# Patient Record
Sex: MALE | Race: WHITE | NOT HISPANIC OR LATINO | Employment: OTHER | URBAN - METROPOLITAN AREA
[De-identification: names, ages, dates, MRNs, and addresses within clinical notes are randomized per-mention and may not be internally consistent; named-entity substitution may affect disease eponyms.]

---

## 2017-01-25 ENCOUNTER — LAB (OUTPATIENT)
Dept: ONCOLOGY | Facility: HOSPITAL | Age: 67
End: 2017-01-25

## 2017-01-25 DIAGNOSIS — D45 POLYCYTHEMIA VERA (HCC): ICD-10-CM

## 2017-01-25 PROCEDURE — 36415 COLL VENOUS BLD VENIPUNCTURE: CPT

## 2017-02-01 ENCOUNTER — APPOINTMENT (OUTPATIENT)
Dept: ONCOLOGY | Facility: HOSPITAL | Age: 67
End: 2017-02-01

## 2017-02-23 RX ORDER — HYDROXYUREA 500 MG/1
CAPSULE ORAL
Qty: 60 CAPSULE | Refills: 11 | Status: SHIPPED | OUTPATIENT
Start: 2017-02-23 | End: 2017-03-06 | Stop reason: SDUPTHER

## 2017-03-06 RX ORDER — HYDROXYUREA 500 MG/1
1000 CAPSULE ORAL DAILY
Qty: 60 CAPSULE | Refills: 11 | Status: SHIPPED | OUTPATIENT
Start: 2017-03-06 | End: 2018-04-02 | Stop reason: SDUPTHER

## 2017-04-26 ENCOUNTER — OFFICE VISIT (OUTPATIENT)
Dept: ONCOLOGY | Facility: CLINIC | Age: 67
End: 2017-04-26

## 2017-04-26 VITALS
BODY MASS INDEX: 28.36 KG/M2 | HEIGHT: 74 IN | DIASTOLIC BLOOD PRESSURE: 80 MMHG | RESPIRATION RATE: 18 BRPM | TEMPERATURE: 97.8 F | HEART RATE: 59 BPM | SYSTOLIC BLOOD PRESSURE: 131 MMHG | WEIGHT: 221 LBS

## 2017-04-26 DIAGNOSIS — D45 POLYCYTHEMIA VERA (HCC): Primary | ICD-10-CM

## 2017-04-26 PROCEDURE — 99213 OFFICE O/P EST LOW 20 MIN: CPT | Performed by: INTERNAL MEDICINE

## 2017-04-26 NOTE — PROGRESS NOTES
"PROBLEM LIST:  Oncology/Hematology History    1.  SHELBY-2 mutation positive polycythemia vera  2.  Goal to maintain HCT at 45 or below   3.   Hydrea 1000 mg/day started 11/2/16            Polycythemia vera    6/30/2016 Initial Diagnosis    Polycythemia vera       REASON FOR VISIT: Polycythemia vera    HISTORY OF PRESENT ILLNESS:   66-year-old gentleman with polycythemia vera presents today for follow-up.  He is currently on Hydrea 1000 mg once a day and seems to be tolerating it reasonably well.  His hemoglobin is 14.1 with an hematocrit of 40.  He denies any symptoms of polycythemia at this time.  He continues on aspirin daily.   No new events todays.  Monthly labs have been stable    Past medical history, social history and family history was reviewed and unchanged from prior visit.    Review of Systems:    Review of Systems   Constitutional: Negative.    HENT:  Negative.    Eyes: Negative.    Respiratory: Negative.    Cardiovascular: Negative.    Gastrointestinal: Negative.    Endocrine: Negative.    Genitourinary: Negative.     Musculoskeletal: Negative.    Skin: Negative.    Hematological: Negative.    Psychiatric/Behavioral: Negative.       A comprehensive 14 point review of systems was performed and was negative except as mentioned.      Medications:  The current medication list was reviewed in the EMR    ALLERGIES:  No Known Allergies      Physical Exam    VITAL SIGNS:  /80  Pulse 59  Temp 97.8 °F (36.6 °C)  Resp 18  Ht 74\" (188 cm)  Wt 221 lb (100 kg)  BMI 28.37 kg/m2     Performance Status: 0    General: well appearing, in no acute distress  HEENT: sclera anicteric, oropharynx clear, neck is supple  Lymphatics: no cervical, supraclavicular, or axillary adenopathy  Cardiovascular: regular rate and rhythm, no murmurs, rubs or gallops  Lungs: clear to auscultation bilaterally  Abdomen: soft, nontender, nondistended.  No palpable organomegaly  Extremities: no lower extremity edema  Skin: no rashes, " lesions, bruising, or petechiae  Msk:  Shows no weakness of the large muscle groups  Psych: Mood is stable      Hemoglobin 14 with a hematocrit of 40 with a MCV of 107 Plt count of 193    Assessment/Plan    66-year-old gentleman with polycythemia vera.  The Hydrea seems to be managing is polycythemia.  Repeat Labs in 3 months then rtc in 6 months.        Abdulkadir Garg MD  [facility] Hematology and Oncology    4/26/2017         Please note that portions of this note may have been completed with a voice recognition program. Efforts were made to edit the dictations, but occasionally words are mistranscribed.

## 2017-10-25 ENCOUNTER — OFFICE VISIT (OUTPATIENT)
Dept: ONCOLOGY | Facility: CLINIC | Age: 67
End: 2017-10-25

## 2017-10-25 VITALS
SYSTOLIC BLOOD PRESSURE: 132 MMHG | HEART RATE: 62 BPM | HEIGHT: 74 IN | DIASTOLIC BLOOD PRESSURE: 76 MMHG | BODY MASS INDEX: 28.49 KG/M2 | RESPIRATION RATE: 18 BRPM | WEIGHT: 222 LBS | TEMPERATURE: 98.1 F

## 2017-10-25 DIAGNOSIS — D45 POLYCYTHEMIA VERA (HCC): Primary | ICD-10-CM

## 2017-10-25 PROCEDURE — 99213 OFFICE O/P EST LOW 20 MIN: CPT | Performed by: INTERNAL MEDICINE

## 2017-10-25 RX ORDER — SODIUM CHLORIDE 9 MG/ML
250 INJECTION, SOLUTION INTRAVENOUS ONCE
Status: CANCELLED | OUTPATIENT
Start: 2017-10-25

## 2017-10-25 RX ORDER — AMOXICILLIN 500 MG/1
500 CAPSULE ORAL 3 TIMES DAILY
Qty: 21 CAPSULE | Refills: 1 | Status: SHIPPED | OUTPATIENT
Start: 2017-10-25 | End: 2019-04-18 | Stop reason: ALTCHOICE

## 2017-10-25 RX ORDER — ASPIRIN 81 MG/1
81 TABLET, CHEWABLE ORAL DAILY
COMMUNITY

## 2017-10-25 NOTE — PROGRESS NOTES
"PROBLEM LIST:  Oncology/Hematology History    1.  SHELBY-2 mutation positive polycythemia vera  2.  Goal to maintain HCT at 45 or below   3.   Hydrea 1000 mg/day started 11/2/16            Polycythemia vera    6/30/2016 Initial Diagnosis     Polycythemia vera          REASON FOR VISIT: Polycythemia vera    HISTORY OF PRESENT ILLNESS:   67-year-old gentleman with polycythemia vera presents today for follow-up.  He is currently on Hydrea 1000 mg once a day and seems to be tolerating it reasonably well.  His hemoglobin is 14.2 with an hematocrit of 42.  He denies any symptoms of polycythemia at this time.  He continues on aspirin daily.   No new events todays.      Past medical history, social history and family history was reviewed and unchanged from prior visit.    Review of Systems:    Review of Systems   Constitutional: Negative.    HENT:  Negative.    Eyes: Negative.    Respiratory: Negative.    Cardiovascular: Negative.    Gastrointestinal: Negative.    Endocrine: Negative.    Genitourinary: Negative.     Musculoskeletal: Negative.    Skin: Negative.    Hematological: Negative.    Psychiatric/Behavioral: Negative.       A comprehensive 14 point review of systems was performed and was negative except as mentioned.      Medications:  The current medication list was reviewed in the EMR    ALLERGIES:  No Known Allergies      Physical Exam    VITAL SIGNS:  /76  Pulse 62  Temp 98.1 °F (36.7 °C)  Resp 18  Ht 74\" (188 cm)  Wt 222 lb (101 kg)  BMI 28.5 kg/m2     Performance Status: 0    General: well appearing, in no acute distress  HEENT: sclera anicteric, oropharynx clear, neck is supple  Lymphatics: no cervical, supraclavicular, or axillary adenopathy  Cardiovascular: regular rate and rhythm, no murmurs, rubs or gallops  Lungs: clear to auscultation bilaterally  Abdomen: soft, nontender, nondistended.  No palpable organomegaly  Extremities: no lower extremity edema  Skin: no rashes, lesions, bruising, or " petechiae  Msk:  Shows no weakness of the large muscle groups  Psych: Mood is stable          Assessment/Plan    67 year-old gentleman with polycythemia vera.  Controlled on hydrea.  Repeat Labs in 3 months then rtc in 6 months.        Abdulkadir Garg MD  [facility] Hematology and Oncology    10/25/2017         Please note that portions of this note may have been completed with a voice recognition program. Efforts were made to edit the dictations, but occasionally words are mistranscribed.

## 2018-01-12 ENCOUNTER — RESULTS ENCOUNTER (OUTPATIENT)
Dept: ONCOLOGY | Facility: CLINIC | Age: 68
End: 2018-01-12

## 2018-01-12 DIAGNOSIS — D45 POLYCYTHEMIA VERA (HCC): ICD-10-CM

## 2018-04-02 RX ORDER — HYDROXYUREA 500 MG/1
1000 CAPSULE ORAL DAILY
Qty: 180 CAPSULE | Refills: 3 | Status: SHIPPED | OUTPATIENT
Start: 2018-04-02 | End: 2019-03-27 | Stop reason: SDUPTHER

## 2018-04-06 ENCOUNTER — RESULTS ENCOUNTER (OUTPATIENT)
Dept: ONCOLOGY | Facility: CLINIC | Age: 68
End: 2018-04-06

## 2018-04-06 DIAGNOSIS — D45 POLYCYTHEMIA VERA (HCC): ICD-10-CM

## 2018-04-20 LAB
BASOPHILS # BLD AUTO: 0 X10E3/UL (ref 0–0.2)
BASOPHILS NFR BLD AUTO: 0 %
EOSINOPHIL # BLD AUTO: 0 X10E3/UL (ref 0–0.4)
EOSINOPHIL NFR BLD AUTO: 0 %
ERYTHROCYTE [DISTWIDTH] IN BLOOD BY AUTOMATED COUNT: 14 % (ref 12.3–15.4)
HCT VFR BLD AUTO: 43.1 % (ref 37.5–51)
HGB BLD-MCNC: 14.7 G/DL (ref 13–17.7)
IMM GRANULOCYTES # BLD: 0 X10E3/UL (ref 0–0.1)
IMM GRANULOCYTES NFR BLD: 0 %
LYMPHOCYTES # BLD AUTO: 2.1 X10E3/UL (ref 0.7–3.1)
LYMPHOCYTES NFR BLD AUTO: 31 %
MCH RBC QN AUTO: 38.3 PG (ref 26.6–33)
MCHC RBC AUTO-ENTMCNC: 34.1 G/DL (ref 31.5–35.7)
MCV RBC AUTO: 112 FL (ref 79–97)
MONOCYTES # BLD AUTO: 0.7 X10E3/UL (ref 0.1–0.9)
MONOCYTES NFR BLD AUTO: 10 %
NEUTROPHILS # BLD AUTO: 4.1 X10E3/UL (ref 1.4–7)
NEUTROPHILS NFR BLD AUTO: 59 %
PLATELET # BLD AUTO: 259 X10E3/UL (ref 150–379)
RBC # BLD AUTO: 3.84 X10E6/UL (ref 4.14–5.8)
WBC # BLD AUTO: 7 X10E3/UL (ref 3.4–10.8)

## 2018-04-26 ENCOUNTER — OFFICE VISIT (OUTPATIENT)
Dept: ONCOLOGY | Facility: CLINIC | Age: 68
End: 2018-04-26

## 2018-04-26 VITALS
WEIGHT: 225 LBS | HEART RATE: 60 BPM | DIASTOLIC BLOOD PRESSURE: 80 MMHG | BODY MASS INDEX: 28.88 KG/M2 | RESPIRATION RATE: 18 BRPM | TEMPERATURE: 98 F | SYSTOLIC BLOOD PRESSURE: 138 MMHG | HEIGHT: 74 IN

## 2018-04-26 DIAGNOSIS — D45 POLYCYTHEMIA VERA (HCC): Primary | ICD-10-CM

## 2018-04-26 PROCEDURE — 99213 OFFICE O/P EST LOW 20 MIN: CPT | Performed by: INTERNAL MEDICINE

## 2018-04-26 NOTE — PROGRESS NOTES
"PROBLEM LIST:  Oncology/Hematology History    1.  SHELBY-2 mutation positive polycythemia vera  2.  Goal to maintain HCT at 45 or below   3.   Hydrea 1000 mg/day started 11/2/16            Polycythemia vera    6/30/2016 Initial Diagnosis     Polycythemia vera          REASON FOR VISIT: Polycythemia vera    HISTORY OF PRESENT ILLNESS:   67-year-old gentleman with polycythemia vera presents today for follow-up.  He is currently on Hydrea 1000 mg once a day and seems to be tolerating it reasonably well.  His hemoglobin is 14.7 with an hematocrit of 43.  He denies any symptoms of polycythemia at this time.  He continues on aspirin daily.   No new events todays.      Past medical history, social history and family history was reviewed and unchanged from prior visit.    Review of Systems:    Review of Systems   Constitutional: Negative.    HENT:  Negative.    Eyes: Negative.    Respiratory: Negative.    Cardiovascular: Negative.    Gastrointestinal: Negative.    Endocrine: Negative.    Genitourinary: Negative.     Musculoskeletal: Negative.    Skin: Negative.    Hematological: Negative.    Psychiatric/Behavioral: Negative.       A comprehensive 14 point review of systems was performed and was negative except as mentioned.      Medications:  The current medication list was reviewed in the EMR    ALLERGIES:  No Known Allergies      Physical Exam    VITAL SIGNS:  /80   Pulse 60   Temp 98 °F (36.7 °C)   Resp 18   Ht 188 cm (74\")   Wt 102 kg (225 lb)   BMI 28.89 kg/m²      Performance Status: 0    General: well appearing, in no acute distress  HEENT: sclera anicteric, oropharynx clear, neck is supple  Lymphatics: no cervical, supraclavicular, or axillary adenopathy  Cardiovascular: regular rate and rhythm, no murmurs, rubs or gallops  Lungs: clear to auscultation bilaterally  Abdomen: soft, nontender, nondistended.  No palpable organomegaly  Extremities: no lower extremity edema  Skin: no rashes, lesions, bruising, or " petechiae  Msk:  Shows no weakness of the large muscle groups  Psych: Mood is stable    Lab Results   Component Value Date    HGB 14.7 04/19/2018    HCT 43.1 04/19/2018     (H) 04/19/2018     04/19/2018    WBC 7.0 04/19/2018    NEUTROABS 4.1 04/19/2018    LYMPHSABS 2.1 04/19/2018    MONOSABS 0.7 04/19/2018    EOSABS 0.0 04/19/2018    BASOSABS 0.0 04/19/2018       Assessment/Plan    1 polycythemia vera.  Controlled on hydrea.  Repeat Labs in 3 months then rtc in 6 months.      2. Fatigue: need to increase activity      Abdulkadir Garg MD  [facility] Hematology and Oncology    4/26/2018         Please note that portions of this note may have been completed with a voice recognition program. Efforts were made to edit the dictations, but occasionally words are mistranscribed.

## 2018-06-29 ENCOUNTER — RESULTS ENCOUNTER (OUTPATIENT)
Dept: ONCOLOGY | Facility: CLINIC | Age: 68
End: 2018-06-29

## 2018-06-29 DIAGNOSIS — D45 POLYCYTHEMIA VERA (HCC): ICD-10-CM

## 2018-07-13 ENCOUNTER — RESULTS ENCOUNTER (OUTPATIENT)
Dept: ONCOLOGY | Facility: CLINIC | Age: 68
End: 2018-07-13

## 2018-07-13 DIAGNOSIS — D45 POLYCYTHEMIA VERA (HCC): ICD-10-CM

## 2018-09-19 ENCOUNTER — TELEPHONE (OUTPATIENT)
Dept: ONCOLOGY | Facility: CLINIC | Age: 68
End: 2018-09-19

## 2018-09-19 NOTE — TELEPHONE ENCOUNTER
----- Message from Arlene Campos RN sent at 9/18/2018  4:43 PM EDT -----  Regarding: FW: ADELE - PATIENT NEEDS SURGERY, HAS QUESTION  Contact: [phone]      ----- Message -----  From: Odessa Elliott  Sent: 9/18/2018   3:29 PM  To: [facility]  Subject: ADELE - PATIENT NEEDS SURGERY, HAS QUESTION        PATIENT BROKE HIS ARM ON Sunday.  HE WILL BE HAVING SURGERY NEXT Monday TO FIX THIS.  WANTED TO MAKE SURE THIS DOESN'T INTERFERE WITH ANYTHING HE SEES DR ANGULO FOR.

## 2018-09-19 NOTE — TELEPHONE ENCOUNTER
Spoke with  Fatou. He fell last week and has a complete fracture in his arm. He is scheduled for surgery on Monday. Per Darrius, may precede as scheduled, no special instructions from him.

## 2018-09-21 ENCOUNTER — RESULTS ENCOUNTER (OUTPATIENT)
Dept: ONCOLOGY | Facility: CLINIC | Age: 68
End: 2018-09-21

## 2018-09-21 DIAGNOSIS — D45 POLYCYTHEMIA VERA (HCC): ICD-10-CM

## 2018-10-05 ENCOUNTER — RESULTS ENCOUNTER (OUTPATIENT)
Dept: ONCOLOGY | Facility: CLINIC | Age: 68
End: 2018-10-05

## 2018-10-05 DIAGNOSIS — D45 POLYCYTHEMIA VERA (HCC): ICD-10-CM

## 2018-10-20 LAB
BASOPHILS # BLD AUTO: 0 X10E3/UL (ref 0–0.2)
BASOPHILS NFR BLD AUTO: 0 %
EOSINOPHIL # BLD AUTO: 0.1 X10E3/UL (ref 0–0.4)
EOSINOPHIL NFR BLD AUTO: 1 %
ERYTHROCYTE [DISTWIDTH] IN BLOOD BY AUTOMATED COUNT: 13.5 % (ref 12.3–15.4)
FERRITIN SERPL-MCNC: 92 NG/ML (ref 30–400)
HCT VFR BLD AUTO: 41.7 % (ref 37.5–51)
HGB BLD-MCNC: 14.4 G/DL (ref 13–17.7)
IMM GRANULOCYTES # BLD: 0 X10E3/UL (ref 0–0.1)
IMM GRANULOCYTES NFR BLD: 0 %
LYMPHOCYTES # BLD AUTO: 2.3 X10E3/UL (ref 0.7–3.1)
LYMPHOCYTES NFR BLD AUTO: 33 %
MCH RBC QN AUTO: 37.2 PG (ref 26.6–33)
MCHC RBC AUTO-ENTMCNC: 34.5 G/DL (ref 31.5–35.7)
MCV RBC AUTO: 108 FL (ref 79–97)
MONOCYTES # BLD AUTO: 0.7 X10E3/UL (ref 0.1–0.9)
MONOCYTES NFR BLD AUTO: 10 %
MORPHOLOGY BLD-IMP: (no result)
NEUTROPHILS # BLD AUTO: 3.8 X10E3/UL (ref 1.4–7)
NEUTROPHILS NFR BLD AUTO: 56 %
PLATELET # BLD AUTO: 228 X10E3/UL (ref 150–379)
RBC # BLD AUTO: 3.87 X10E6/UL (ref 4.14–5.8)
WBC # BLD AUTO: 7 X10E3/UL (ref 3.4–10.8)

## 2018-10-25 ENCOUNTER — OFFICE VISIT (OUTPATIENT)
Dept: ONCOLOGY | Facility: CLINIC | Age: 68
End: 2018-10-25

## 2018-10-25 VITALS
HEART RATE: 64 BPM | DIASTOLIC BLOOD PRESSURE: 83 MMHG | SYSTOLIC BLOOD PRESSURE: 139 MMHG | TEMPERATURE: 98.4 F | WEIGHT: 223 LBS | HEIGHT: 74 IN | BODY MASS INDEX: 28.62 KG/M2 | RESPIRATION RATE: 18 BRPM

## 2018-10-25 DIAGNOSIS — D45 POLYCYTHEMIA VERA (HCC): Primary | ICD-10-CM

## 2018-10-25 PROCEDURE — 99213 OFFICE O/P EST LOW 20 MIN: CPT | Performed by: INTERNAL MEDICINE

## 2018-10-25 NOTE — PROGRESS NOTES
"PROBLEM LIST:  Oncology/Hematology History    1.  SHELBY-2 mutation positive polycythemia vera  2.  Goal to maintain HCT at 45 or below   3.   Hydrea 1000 mg/day started 11/2/16            Polycythemia vera (CMS/[facility])    6/30/2016 Initial Diagnosis     Polycythemia vera          REASON FOR VISIT: Polycythemia vera    HISTORY OF PRESENT ILLNESS:   68 y.o.  gentleman with polycythemia vera presents today for follow-up.  He is currently on Hydrea 1000 mg once a day and seems to be tolerating it reasonably well.  His hemoglobin is 14.4 with an hematocrit of 41.  He denies any symptoms of polycythemia at this time.  He continues on aspirin daily.   No new events todays.  Recent fracture of the right arm due to trauma.    Past medical history, social history and family history was reviewed and unchanged from prior visit.    Review of Systems:    Review of Systems   Constitutional: Negative.    HENT:  Negative.    Eyes: Negative.    Respiratory: Negative.    Cardiovascular: Negative.    Gastrointestinal: Negative.    Endocrine: Negative.    Genitourinary: Negative.     Musculoskeletal: Negative.    Skin: Negative.    Hematological: Negative.    Psychiatric/Behavioral: Negative.       A comprehensive 14 point review of systems was performed and was negative except as mentioned.      Medications:  The current medication list was reviewed in the EMR    ALLERGIES:  No Known Allergies      Physical Exam    VITAL SIGNS:  /83   Pulse 64   Temp 98.4 °F (36.9 °C)   Resp 18   Ht 188 cm (74\")   Wt 101 kg (223 lb)   BMI 28.63 kg/m²      Performance Status: 0    General: well appearing, in no acute distress  HEENT: sclera anicteric, oropharynx clear, neck is supple  Lymphatics: no cervical, supraclavicular, or axillary adenopathy  Cardiovascular: regular rate and rhythm, no murmurs, rubs or gallops  Lungs: clear to auscultation bilaterally  Abdomen: soft, nontender, nondistended.  No palpable organomegaly  Extremities: no lower " extremity edema  Skin: no rashes, lesions, bruising, or petechiae  Msk:  Shows no weakness of the large muscle groups  Psych: Mood is stable    Lab Results   Component Value Date    HGB 14.4 10/19/2018    HCT 41.7 10/19/2018     (H) 10/19/2018     10/19/2018    WBC 7.0 04/19/2018    NEUTROABS 3.8 10/19/2018    LYMPHSABS 2.3 10/19/2018    MONOSABS 0.7 10/19/2018    EOSABS 0.1 10/19/2018    BASOSABS 0.0 10/19/2018       Assessment/Plan    1.  polycythemia vera.  Controlled on hydrea.  Repeat Labs in 6 months      2. Fatigue: need to increase activity      Abdulkadir Garg MD  [facility] Hematology and Oncology    10/25/2018     Return on: 04/17/19  Return in (Approximately): 6 months    Orders Placed This Encounter   Procedures   • CBC & Differential     Standing Status:   Future     Standing Expiration Date:   10/25/2019     Order Specific Question:   Manual Differential     Answer:   No         Please note that portions of this note may have been completed with a voice recognition program. Efforts were made to edit the dictations, but occasionally words are mistranscribed.

## 2018-12-28 ENCOUNTER — RESULTS ENCOUNTER (OUTPATIENT)
Dept: ONCOLOGY | Facility: CLINIC | Age: 68
End: 2018-12-28

## 2018-12-28 DIAGNOSIS — D45 POLYCYTHEMIA VERA (HCC): ICD-10-CM

## 2019-03-22 ENCOUNTER — RESULTS ENCOUNTER (OUTPATIENT)
Dept: ONCOLOGY | Facility: CLINIC | Age: 69
End: 2019-03-22

## 2019-03-22 DIAGNOSIS — D45 POLYCYTHEMIA VERA (HCC): ICD-10-CM

## 2019-03-27 RX ORDER — HYDROXYUREA 500 MG/1
1000 CAPSULE ORAL DAILY
Qty: 180 CAPSULE | Refills: 3 | Status: SHIPPED | OUTPATIENT
Start: 2019-03-27 | End: 2020-03-18

## 2019-04-13 LAB
BASOPHILS # BLD AUTO: 0 X10E3/UL (ref 0–0.2)
BASOPHILS NFR BLD AUTO: 0 %
EOSINOPHIL # BLD AUTO: 0 X10E3/UL (ref 0–0.4)
EOSINOPHIL NFR BLD AUTO: 0 %
ERYTHROCYTE [DISTWIDTH] IN BLOOD BY AUTOMATED COUNT: 13.7 % (ref 12.3–15.4)
FERRITIN SERPL-MCNC: 121 NG/ML (ref 30–400)
HCT VFR BLD AUTO: 41.4 % (ref 37.5–51)
HGB BLD-MCNC: 14.4 G/DL (ref 13–17.7)
IMM GRANULOCYTES # BLD AUTO: 0 X10E3/UL (ref 0–0.1)
IMM GRANULOCYTES NFR BLD AUTO: 0 %
LYMPHOCYTES # BLD AUTO: 1.8 X10E3/UL (ref 0.7–3.1)
LYMPHOCYTES NFR BLD AUTO: 27 %
MCH RBC QN AUTO: 38 PG (ref 26.6–33)
MCHC RBC AUTO-ENTMCNC: 34.8 G/DL (ref 31.5–35.7)
MCV RBC AUTO: 109 FL (ref 79–97)
MONOCYTES # BLD AUTO: 0.6 X10E3/UL (ref 0.1–0.9)
MONOCYTES NFR BLD AUTO: 10 %
NEUTROPHILS # BLD AUTO: 4.1 X10E3/UL (ref 1.4–7)
NEUTROPHILS NFR BLD AUTO: 63 %
PLATELET # BLD AUTO: 234 X10E3/UL (ref 150–379)
RBC # BLD AUTO: 3.79 X10E6/UL (ref 4.14–5.8)
WBC # BLD AUTO: 6.5 X10E3/UL (ref 3.4–10.8)

## 2019-04-18 ENCOUNTER — OFFICE VISIT (OUTPATIENT)
Dept: ONCOLOGY | Facility: CLINIC | Age: 69
End: 2019-04-18

## 2019-04-18 VITALS
RESPIRATION RATE: 18 BRPM | SYSTOLIC BLOOD PRESSURE: 137 MMHG | HEART RATE: 61 BPM | HEIGHT: 74 IN | WEIGHT: 222 LBS | TEMPERATURE: 98.5 F | BODY MASS INDEX: 28.49 KG/M2 | DIASTOLIC BLOOD PRESSURE: 83 MMHG

## 2019-04-18 DIAGNOSIS — Z79.899 ENCOUNTER FOR LONG-TERM (CURRENT) USE OF HIGH-RISK MEDICATION: ICD-10-CM

## 2019-04-18 DIAGNOSIS — D45 POLYCYTHEMIA VERA (HCC): Primary | ICD-10-CM

## 2019-04-18 PROCEDURE — 99213 OFFICE O/P EST LOW 20 MIN: CPT | Performed by: NURSE PRACTITIONER

## 2019-04-18 NOTE — PROGRESS NOTES
"PROBLEM LIST:  Oncology/Hematology History    1.  SHELBY-2 mutation positive polycythemia vera  2.  Goal to maintain HCT at 45 or below   3.   Hydrea 1000 mg/day started 11/2/16            Polycythemia vera (CMS/[facility])    6/30/2016 Initial Diagnosis     Polycythemia vera            REASON FOR VISIT: Polycythemia vera    HISTORY OF PRESENT ILLNESS:   68 y.o.  gentleman with polycythemia vera presents today for follow-up.  He is currently on Hydrea 1000 mg once a day and seems to be tolerating it reasonably well.  His CBC is normal, hemoglobin is 14.4 with an hematocrit of 41.4.  he denies any symptoms of polycythemia at this time.  He continues on aspirin daily.   He does report fatigue but this does not interfere with his daily activities and he is quite active farming, working and traveling.      Past medical history, social history and family history was reviewed and unchanged from prior visit.    Review of Systems:    Review of Systems   Constitutional: Positive for fatigue.   HENT:  Negative.    Eyes: Negative.    Respiratory: Negative.    Cardiovascular: Negative.    Gastrointestinal: Negative.    Endocrine: Negative.    Genitourinary: Negative.     Musculoskeletal: Negative.    Skin: Negative.    Neurological: Negative.    Hematological: Negative.    Psychiatric/Behavioral: Negative.       A comprehensive 14 point review of systems was performed and was negative except as mentioned.      Medications:  The current medication list was reviewed in the EMR    ALLERGIES:  No Known Allergies      Physical Exam    VITAL SIGNS:  /83   Pulse 61   Temp 98.5 °F (36.9 °C)   Resp 18   Ht 188 cm (74\")   Wt 101 kg (222 lb)   BMI 28.50 kg/m²      Performance Status: 0    General: well appearing, in no acute distress  HEENT: sclera anicteric, oropharynx clear, neck is supple  Lymphatics: no cervical, supraclavicular, or axillary adenopathy  Cardiovascular: regular rate and rhythm, no murmurs, rubs or gallops  Lungs: " clear to auscultation bilaterally  Abdomen: soft, nontender, nondistended.  No palpable organomegaly  Extremities: no lower extremity edema  Skin: no rashes, lesions, bruising, or petechiae  Msk:  Shows no weakness of the large muscle groups  Psych: Mood is stable    Lab Results   Component Value Date    HGB 14.4 04/12/2019    HCT 41.4 04/12/2019     (H) 04/12/2019     04/12/2019    WBC 6.5 04/12/2019    NEUTROABS 4.1 04/12/2019    LYMPHSABS 1.8 04/12/2019    MONOSABS 0.6 04/12/2019    EOSABS 0.0 04/12/2019    BASOSABS 0.0 04/12/2019       Assessment/Plan    1.  Polycythemia vera.  Controlled on hydrea, CBC as outlined above was normal, hematocrit 41.4% with goal to keep under 45%.  He has not required phlebotomy for the past 2 years.  He does have nondescript fatigue but this does not interfere with daily life, he remains quite active farming, working and traveling often.  We will repeat Labs in 6 months prior to return.      I spent 15 minutes with the patient. I spent > 50% percent of this time counseling and discussing prognosis, diagnostic testing, evaluation, current status and management.    Garima Oswald [facility] Hematology and Oncology    4/18/2019     Return in (Approximately): 6 months    Orders Placed This Encounter   Procedures   • Comprehensive Metabolic Panel     Standing Status:   Future     Standing Expiration Date:   4/17/2020   • CBC & Differential     Standing Status:   Future     Standing Expiration Date:   4/17/2020     Order Specific Question:   Manual Differential     Answer:   No         Please note that portions of this note may have been completed with a voice recognition program. Efforts were made to edit the dictations, but occasionally words are mistranscribed.

## 2019-10-25 LAB
ALBUMIN SERPL-MCNC: 4.3 G/DL (ref 3.5–4.8)
ALBUMIN/GLOB SERPL: 1.9 {RATIO} (ref 1.2–2.2)
ALP SERPL-CCNC: 74 IU/L (ref 39–117)
ALT SERPL-CCNC: 20 IU/L (ref 0–44)
AST SERPL-CCNC: 22 IU/L (ref 0–40)
BASOPHILS # BLD AUTO: 0 X10E3/UL (ref 0–0.2)
BASOPHILS NFR BLD AUTO: 0 %
BILIRUB SERPL-MCNC: 0.4 MG/DL (ref 0–1.2)
BUN SERPL-MCNC: 15 MG/DL (ref 8–27)
BUN/CREAT SERPL: 19 (ref 10–24)
CALCIUM SERPL-MCNC: 9.5 MG/DL (ref 8.6–10.2)
CHLORIDE SERPL-SCNC: 103 MMOL/L (ref 96–106)
CO2 SERPL-SCNC: 26 MMOL/L (ref 20–29)
CREAT SERPL-MCNC: 0.79 MG/DL (ref 0.76–1.27)
EOSINOPHIL # BLD AUTO: 0 X10E3/UL (ref 0–0.4)
EOSINOPHIL NFR BLD AUTO: 0 %
ERYTHROCYTE [DISTWIDTH] IN BLOOD BY AUTOMATED COUNT: 14.4 % (ref 12.3–15.4)
GLOBULIN SER CALC-MCNC: 2.3 G/DL (ref 1.5–4.5)
GLUCOSE SERPL-MCNC: 96 MG/DL (ref 65–99)
HCT VFR BLD AUTO: 42 % (ref 37.5–51)
HGB BLD-MCNC: 14.9 G/DL (ref 13–17.7)
IMM GRANULOCYTES # BLD AUTO: 0 X10E3/UL (ref 0–0.1)
IMM GRANULOCYTES NFR BLD AUTO: 0 %
LYMPHOCYTES # BLD AUTO: 2.2 X10E3/UL (ref 0.7–3.1)
LYMPHOCYTES NFR BLD AUTO: 27 %
MCH RBC QN AUTO: 39 PG (ref 26.6–33)
MCHC RBC AUTO-ENTMCNC: 35.5 G/DL (ref 31.5–35.7)
MCV RBC AUTO: 110 FL (ref 79–97)
MONOCYTES # BLD AUTO: 0.7 X10E3/UL (ref 0.1–0.9)
MONOCYTES NFR BLD AUTO: 9 %
NEUTROPHILS # BLD AUTO: 5 X10E3/UL (ref 1.4–7)
NEUTROPHILS NFR BLD AUTO: 64 %
PLATELET # BLD AUTO: 261 X10E3/UL (ref 150–450)
POTASSIUM SERPL-SCNC: 4.4 MMOL/L (ref 3.5–5.2)
PROT SERPL-MCNC: 6.6 G/DL (ref 6–8.5)
RBC # BLD AUTO: 3.82 X10E6/UL (ref 4.14–5.8)
SODIUM SERPL-SCNC: 142 MMOL/L (ref 134–144)
WBC # BLD AUTO: 7.9 X10E3/UL (ref 3.4–10.8)

## 2019-10-29 ENCOUNTER — OFFICE VISIT (OUTPATIENT)
Dept: ONCOLOGY | Facility: CLINIC | Age: 69
End: 2019-10-29

## 2019-10-29 VITALS
SYSTOLIC BLOOD PRESSURE: 140 MMHG | WEIGHT: 215 LBS | TEMPERATURE: 97.6 F | HEART RATE: 65 BPM | RESPIRATION RATE: 18 BRPM | HEIGHT: 74 IN | DIASTOLIC BLOOD PRESSURE: 70 MMHG | BODY MASS INDEX: 27.59 KG/M2

## 2019-10-29 DIAGNOSIS — D45 POLYCYTHEMIA VERA (HCC): Primary | ICD-10-CM

## 2019-10-29 PROCEDURE — 99214 OFFICE O/P EST MOD 30 MIN: CPT | Performed by: INTERNAL MEDICINE

## 2019-10-29 RX ORDER — AMOXICILLIN 500 MG/1
1000 CAPSULE ORAL 2 TIMES DAILY
Qty: 20 CAPSULE | Refills: 0 | Status: SHIPPED | OUTPATIENT
Start: 2019-10-29 | End: 2020-04-29

## 2019-10-29 NOTE — PROGRESS NOTES
CHIEF COMPLAINT: Sandeep 2+ PRV    Problem List:  Oncology/Hematology History    1.  SANDEEP-2 mutation positive polycythemia vera  2.  Goal to maintain HCT at 45 or below   3.   Hydrea 1000 mg/day started 11/2/16            Polycythemia vera (CMS/HCC)    6/30/2016 Initial Diagnosis     Polycythemia vera            HISTORY OF PRESENT ILLNESS:  The patient is a 69 y.o. male, here for follow up on management of Sandeep 2+ PRV on chronic Hydrea.  White count 7900 with hemoglobin 14.9 platelets 261,000 with normal CMP.      Past Medical History:   Diagnosis Date   • Arthritis    • Erythrocytosis 10/20/2016   • Hypertension    • Polycythemia vera (CMS/HCC)      No past surgical history on file.    No Known Allergies    Family History and Social History reviewed and changed as necessary      REVIEW OF SYSTEM:   Review of Systems   Constitutional: Negative for appetite change, chills, diaphoresis, fatigue, fever and unexpected weight change.   HENT:   Negative for mouth sores, sore throat and trouble swallowing.    Eyes: Negative for icterus.   Respiratory: Negative for cough, hemoptysis and shortness of breath.    Cardiovascular: Negative for chest pain, leg swelling and palpitations.   Gastrointestinal: Negative for abdominal distention, abdominal pain, blood in stool, constipation, diarrhea, nausea and vomiting.   Endocrine: Negative for hot flashes.   Genitourinary: Negative for bladder incontinence, difficulty urinating, dysuria, frequency and hematuria.    Musculoskeletal: Negative for gait problem, neck pain and neck stiffness.   Skin: Negative for rash.   Neurological: Negative for dizziness, gait problem, headaches, light-headedness and numbness.   Hematological: Negative for adenopathy. Does not bruise/bleed easily.   Psychiatric/Behavioral: Negative for depression. The patient is not nervous/anxious.    All other systems reviewed and are negative.       PHYSICAL EXAM    Vitals:    10/29/19 0801   BP: 140/70   Pulse: 65  "  Resp: 18   Temp: 97.6 °F (36.4 °C)   Weight: 97.5 kg (215 lb)   Height: 188 cm (74\")     Constitutional: Appears well-developed and well-nourished. No distress.   ECOG: (0) Fully active, able to carry on all predisease performance without restriction  HENT:   Head: Normocephalic.   Mouth/Throat: Oropharynx is clear and moist.   Eyes: Conjunctivae are normal. Pupils are equal, round, and reactive to light. No scleral icterus.   Neck: Neck supple. No JVD present. No thyromegaly present.   Cardiovascular: Normal rate, regular rhythm and normal heart sounds.    Pulmonary/Chest: Breath sounds normal. No respiratory distress.   Abdominal: Soft. Exhibits no distension and no mass. There is no hepatosplenomegaly. There is no tenderness. There is no rebound and no guarding.   Musculoskeletal:Exhibits no edema, tenderness or deformity.   Neurological: Alert and oriented to person, place, and time. Exhibits normal muscle tone.   Skin: No ecchymosis, no petechiae and no rash noted. Not diaphoretic. No cyanosis. Nails show no clubbing.   Psychiatric: Normal mood and affect.   Vitals reviewed.      Lab Results   Component Value Date    HGB 14.9 10/24/2019    HCT 42.0 10/24/2019     (H) 10/24/2019     10/24/2019    WBC 7.9 10/24/2019    NEUTROABS 5.0 10/24/2019    LYMPHSABS 2.2 10/24/2019    MONOSABS 0.7 10/24/2019    EOSABS 0.0 10/24/2019    BASOSABS 0.0 10/24/2019       Lab Results   Component Value Date    BUN 15 10/24/2019    CREATININE 0.79 10/24/2019     10/24/2019    K 4.4 10/24/2019     10/24/2019    CO2 26 10/24/2019    CALCIUM 9.5 10/24/2019    ALBUMIN 4.3 10/24/2019    BILITOT 0.4 10/24/2019    ALKPHOS 74 10/24/2019    AST 22 10/24/2019    ALT 20 10/24/2019                   ASSESSMENT & PLAN:    1. SHELBY 2+ polycythemia rubra vera  2. Getting ready to travel to [place]    Discussion: doing wonderfully.  Will give amoxicillin for possible infectious complications that have occurred on prior trips " he has taken abroad.  Otherwise his counts are doing great and we will continue his Hydrea and get a blood count just prior to return to my nurse practitioner in 6 months.  My nurse practitioner will continue to follow him about twice a year.  No hint of entering spent phase or leukemic phase.  Discussed with patient face-to-face 25 minutes greater than 50% spent counseling regarding this plan    Abdelrahman Santos MD    10/29/2019

## 2020-03-18 RX ORDER — HYDROXYUREA 500 MG/1
CAPSULE ORAL
Qty: 180 CAPSULE | Refills: 3 | Status: SHIPPED | OUTPATIENT
Start: 2020-03-18 | End: 2021-03-29

## 2020-04-25 LAB
BASOPHILS # BLD AUTO: 0 X10E3/UL (ref 0–0.2)
BASOPHILS NFR BLD AUTO: 0 %
EOSINOPHIL # BLD AUTO: 0 X10E3/UL (ref 0–0.4)
EOSINOPHIL NFR BLD AUTO: 0 %
ERYTHROCYTE [DISTWIDTH] IN BLOOD BY AUTOMATED COUNT: 12.4 % (ref 11.6–15.4)
HCT VFR BLD AUTO: 43.1 % (ref 37.5–51)
HGB BLD-MCNC: 14.9 G/DL (ref 13–17.7)
IMM GRANULOCYTES # BLD AUTO: 0 X10E3/UL (ref 0–0.1)
IMM GRANULOCYTES NFR BLD AUTO: 0 %
LYMPHOCYTES # BLD AUTO: 1.8 X10E3/UL (ref 0.7–3.1)
LYMPHOCYTES NFR BLD AUTO: 31 %
MCH RBC QN AUTO: 37.9 PG (ref 26.6–33)
MCHC RBC AUTO-ENTMCNC: 34.6 G/DL (ref 31.5–35.7)
MCV RBC AUTO: 110 FL (ref 79–97)
MONOCYTES # BLD AUTO: 0.4 X10E3/UL (ref 0.1–0.9)
MONOCYTES NFR BLD AUTO: 8 %
NEUTROPHILS # BLD AUTO: 3.5 X10E3/UL (ref 1.4–7)
NEUTROPHILS NFR BLD AUTO: 61 %
PLATELET # BLD AUTO: 233 X10E3/UL (ref 150–450)
RBC # BLD AUTO: 3.93 X10E6/UL (ref 4.14–5.8)
WBC # BLD AUTO: 5.8 X10E3/UL (ref 3.4–10.8)

## 2020-04-29 ENCOUNTER — TELEMEDICINE (OUTPATIENT)
Dept: ONCOLOGY | Facility: CLINIC | Age: 70
End: 2020-04-29

## 2020-04-29 DIAGNOSIS — Z79.899 ENCOUNTER FOR LONG-TERM CURRENT USE OF HIGH RISK MEDICATION: ICD-10-CM

## 2020-04-29 DIAGNOSIS — D45 POLYCYTHEMIA VERA (HCC): Primary | ICD-10-CM

## 2020-04-29 PROCEDURE — 99213 OFFICE O/P EST LOW 20 MIN: CPT | Performed by: NURSE PRACTITIONER

## 2020-04-29 NOTE — PROGRESS NOTES
CHIEF COMPLAINT: Sandeep 2+ PV    This was an audio and video enabled telemedicine encounter.    Problem List:  Oncology/Hematology History    1.  SANDEEP-2 mutation positive polycythemia vera  2.  Goal to maintain HCT at 45 or below   3.   Hydrea 1000 mg/day started 11/2/16            Polycythemia vera (CMS/HCC)    6/30/2016 Initial Diagnosis     Polycythemia vera         HISTORY OF PRESENT ILLNESS:  The patient is a 69 y.o. male, here for follow up on management of Sandeep 2+ PV on Hydrea 1000 mg daily.  He has been doing well since we saw him last with no change in his health.  States he had a physical with Dr. Delgado in January and was given a good bill of health.  Currently practicing good social distancing during COVID-19 pandemic.  Denies any respiratory concerns, no fevers or chills.  Tolerating Hydrea with no unusual side effects.  No illnesses or hospitalizations since we saw him last.      Past Medical History:   Diagnosis Date   • Arthritis    • Erythrocytosis 10/20/2016   • Hypertension    • Polycythemia vera (CMS/HCC)      History reviewed. No pertinent surgical history.    No Known Allergies    Family History and Social History reviewed and changed as necessary      REVIEW OF SYSTEM:   Review of Systems   Constitutional: Negative for appetite change, chills, diaphoresis, fatigue, fever and unexpected weight change.   HENT:   Negative for mouth sores, sore throat and trouble swallowing.    Eyes: Negative for icterus.   Respiratory: Negative for cough, hemoptysis and shortness of breath.    Cardiovascular: Negative for chest pain, leg swelling and palpitations.   Gastrointestinal: Negative for abdominal distention, abdominal pain, blood in stool, constipation, diarrhea, nausea and vomiting.   Genitourinary: Negative for bladder incontinence, difficulty urinating, dysuria, frequency and hematuria.    Musculoskeletal: Negative for gait problem, neck pain and neck stiffness.   Skin: Negative for rash.   Neurological:  Negative for dizziness, gait problem, headaches, light-headedness and numbness.   Hematological: Negative for adenopathy. Does not bruise/bleed easily.   Psychiatric/Behavioral: Negative for depression. The patient is not nervous/anxious.    All other systems reviewed and are negative.       PHYSICAL EXAM    There were no vitals filed for this visit.   Vitals:    04/29/20 0849   PainSc: 0-No pain          Constitutional: Appears well-developed and well-nourished. No distress.   ECOG: (0) Fully active, able to carry on all predisease performance without restriction  HENT:   Head: Normocephalic.   Mouth/Throat: Oropharynx is clear and moist.   Eyes: Conjunctivae are normal. Pupils are equal, round. No scleral icterus.   Neck: Neck supple.   Pulmonary/Chest: Able to carry on a conversation without difficulty.  No respiratory distress.   Neurological: Alert and oriented to person, place, and time.    Psychiatric: Normal mood and affect.   Labs reviewed.      Lab Results   Component Value Date    HGB 14.9 04/24/2020    HCT 43.1 04/24/2020     (H) 04/24/2020     04/24/2020    WBC 5.8 04/24/2020    NEUTROABS 3.5 04/24/2020    LYMPHSABS 1.8 04/24/2020    MONOSABS 0.4 04/24/2020    EOSABS 0.0 04/24/2020    BASOSABS 0.0 04/24/2020             ASSESSMENT & PLAN:    1. SHELBY 2+ polycythemia rubra vera    Discussion: The patient continues to do well and is in good health.  Tolerating Hydrea 1000 mg daily with no unusual side effects.  He did not need a refill today.  He is practicing good social distancing during current COVID-19 pandemic.  No current health concerns.  I will see him back in 6 months for follow-up with CBC on return.    I spent a total of 15 minutes in direct patient care, greater than 10  minutes via video tele-visit were spent in coordination of care, and counseling the patient regarding review of current lab work, assessment, medication review and discussing future plan of care. Answered any  questions patient had regarding medications and plan of care.     Garima Oswald, APRN    04/29/2020

## 2020-10-20 LAB
BASOPHILS # BLD AUTO: 0 X10E3/UL (ref 0–0.2)
BASOPHILS NFR BLD AUTO: 1 %
EOSINOPHIL # BLD AUTO: 0 X10E3/UL (ref 0–0.4)
EOSINOPHIL NFR BLD AUTO: 0 %
ERYTHROCYTE [DISTWIDTH] IN BLOOD BY AUTOMATED COUNT: 12.5 % (ref 11.6–15.4)
HCT VFR BLD AUTO: 41.4 % (ref 37.5–51)
HGB BLD-MCNC: 14.8 G/DL (ref 13–17.7)
IMM GRANULOCYTES # BLD AUTO: 0 X10E3/UL (ref 0–0.1)
IMM GRANULOCYTES NFR BLD AUTO: 0 %
LYMPHOCYTES # BLD AUTO: 1.8 X10E3/UL (ref 0.7–3.1)
LYMPHOCYTES NFR BLD AUTO: 33 %
MCH RBC QN AUTO: 38.9 PG (ref 26.6–33)
MCHC RBC AUTO-ENTMCNC: 35.7 G/DL (ref 31.5–35.7)
MCV RBC AUTO: 109 FL (ref 79–97)
MONOCYTES # BLD AUTO: 0.6 X10E3/UL (ref 0.1–0.9)
MONOCYTES NFR BLD AUTO: 10 %
NEUTROPHILS # BLD AUTO: 3 X10E3/UL (ref 1.4–7)
NEUTROPHILS NFR BLD AUTO: 56 %
PLATELET # BLD AUTO: 246 X10E3/UL (ref 150–450)
RBC # BLD AUTO: 3.8 X10E6/UL (ref 4.14–5.8)
WBC # BLD AUTO: 5.4 X10E3/UL (ref 3.4–10.8)

## 2020-10-28 ENCOUNTER — OFFICE VISIT (OUTPATIENT)
Dept: ONCOLOGY | Facility: CLINIC | Age: 70
End: 2020-10-28

## 2020-10-28 VITALS
HEART RATE: 60 BPM | HEIGHT: 74 IN | SYSTOLIC BLOOD PRESSURE: 136 MMHG | WEIGHT: 203 LBS | TEMPERATURE: 97.8 F | BODY MASS INDEX: 26.05 KG/M2 | RESPIRATION RATE: 18 BRPM | DIASTOLIC BLOOD PRESSURE: 75 MMHG

## 2020-10-28 DIAGNOSIS — D45 POLYCYTHEMIA VERA (HCC): Primary | ICD-10-CM

## 2020-10-28 PROCEDURE — 99212 OFFICE O/P EST SF 10 MIN: CPT | Performed by: NURSE PRACTITIONER

## 2020-10-28 NOTE — PROGRESS NOTES
CHIEF COMPLAINT: Sandeep 2+ PRV    Problem List:  Oncology/Hematology History Overview Note   1.  SANDEEP-2 mutation positive polycythemia vera  2.  Goal to maintain HCT at 45 or below   3.   Hydrea 1000 mg/day started 11/2/16         Polycythemia vera (CMS/HCC)   6/30/2016 Initial Diagnosis    Polycythemia vera         HISTORY OF PRESENT ILLNESS:  The patient is a 70 y.o. male, here for follow up on management of Sandeep 2+ PRV on chronic Hydrea.  Mr. Lainez has been doing well since we saw him last with no new concerns.  Tolerating Hydrea 1000 mg daily with no unusual side effects.  Remains active farming.  Has had no illnesses or hospitalizations since we saw him last.      Past Medical History:   Diagnosis Date   • Arthritis    • Erythrocytosis 10/20/2016   • Hypertension    • Polycythemia vera (CMS/HCC)      History reviewed. No pertinent surgical history.    No Known Allergies    Family History and Social History reviewed and changed as necessary      REVIEW OF SYSTEM:   Review of Systems   Constitutional: Negative for appetite change, chills, diaphoresis, fatigue, fever and unexpected weight change.   HENT:   Negative for mouth sores, sore throat and trouble swallowing.    Eyes: Negative for icterus.   Respiratory: Negative for cough, hemoptysis and shortness of breath.    Cardiovascular: Negative for chest pain, leg swelling and palpitations.   Gastrointestinal: Negative for abdominal distention, abdominal pain, blood in stool, constipation, diarrhea, nausea and vomiting.   Genitourinary: Negative for bladder incontinence, difficulty urinating, dysuria, frequency and hematuria.    Musculoskeletal: Negative for gait problem, neck pain and neck stiffness.   Skin: Negative for rash.   Neurological: Negative for dizziness, gait problem, headaches, light-headedness and numbness.   Hematological: Negative for adenopathy. Does not bruise/bleed easily.   Psychiatric/Behavioral: Negative for depression. The patient is not  "nervous/anxious.    All other systems reviewed and are negative.       PHYSICAL EXAM    Vitals:    10/28/20 0832   BP: 136/75   Pulse: 60   Resp: 18   Temp: 97.8 °F (36.6 °C)   Weight: 92.1 kg (203 lb)   Height: 188 cm (74\")     Constitutional: Appears well-developed and well-nourished. No distress.   ECOG: (0) Fully active, able to carry on all predisease performance without restriction  HENT:   Head: Normocephalic.   Mouth/Throat: Oropharynx is clear and moist.   Eyes: Conjunctivae are normal. Pupils are equal, round, and reactive to light. No scleral icterus.   Neck: Neck supple. No JVD present.   Cardiovascular: Normal rate, regular rhythm and normal heart sounds.    Pulmonary/Chest: Breath sounds normal. No respiratory distress.   Abdominal: Soft. Exhibits no distension.  There is no tenderness.   Musculoskeletal:Exhibits no edema, tenderness or deformity.   Neurological: Alert and oriented to person, place, and time. Exhibits normal muscle tone.   Skin: No ecchymosis, no petechiae and no rash noted. Not diaphoretic. No cyanosis. Nails show no clubbing.   Psychiatric: Normal mood and affect.   Vitals reviewed.  Labs reviewed.    Lab Results   Component Value Date    HGB 14.8 10/19/2020    HCT 41.4 10/19/2020     (H) 10/19/2020     10/19/2020    WBC 5.4 10/19/2020    NEUTROABS 3.0 10/19/2020    LYMPHSABS 1.8 10/19/2020    MONOSABS 0.6 10/19/2020    EOSABS 0.0 10/19/2020    BASOSABS 0.0 10/19/2020         ASSESSMENT & PLAN:    1. SHELBY 2+ polycythemia rubra vera    Discussion: The patient continues to do wonderfully.  His counts are doing great as shown on CBC above.  He will continue his Hydrea 1000 mg daily along with baby aspirin.  He will get a blood count just prior to return in 6 months.      I spent a total of 10 minutes in direct patient care, greater than 8  minutes face to face, time was spent in coordination of care, and counseling the patient regarding review of labs, discussion of plan of " care going forward .  Answered any questions patient had regarding medications and plan of care.     Garima Oswald, APRN    10/28/2020

## 2021-03-29 DIAGNOSIS — D45 POLYCYTHEMIA VERA (HCC): Primary | ICD-10-CM

## 2021-03-29 RX ORDER — HYDROXYUREA 500 MG/1
CAPSULE ORAL
Qty: 180 CAPSULE | Refills: 2 | Status: SHIPPED | OUTPATIENT
Start: 2021-03-29 | End: 2021-12-27

## 2021-04-17 LAB
BASOPHILS # BLD AUTO: 0 X10E3/UL (ref 0–0.2)
BASOPHILS NFR BLD AUTO: 1 %
EOSINOPHIL # BLD AUTO: 0 X10E3/UL (ref 0–0.4)
EOSINOPHIL NFR BLD AUTO: 1 %
ERYTHROCYTE [DISTWIDTH] IN BLOOD BY AUTOMATED COUNT: 12.5 % (ref 11.6–15.4)
HCT VFR BLD AUTO: 41.7 % (ref 37.5–51)
HGB BLD-MCNC: 14.6 G/DL (ref 13–17.7)
IMM GRANULOCYTES # BLD AUTO: 0 X10E3/UL (ref 0–0.1)
IMM GRANULOCYTES NFR BLD AUTO: 0 %
LYMPHOCYTES # BLD AUTO: 1.9 X10E3/UL (ref 0.7–3.1)
LYMPHOCYTES NFR BLD AUTO: 36 %
MCH RBC QN AUTO: 38.1 PG (ref 26.6–33)
MCHC RBC AUTO-ENTMCNC: 35 G/DL (ref 31.5–35.7)
MCV RBC AUTO: 109 FL (ref 79–97)
MONOCYTES # BLD AUTO: 0.5 X10E3/UL (ref 0.1–0.9)
MONOCYTES NFR BLD AUTO: 9 %
NEUTROPHILS # BLD AUTO: 2.7 X10E3/UL (ref 1.4–7)
NEUTROPHILS NFR BLD AUTO: 53 %
PLATELET # BLD AUTO: 246 X10E3/UL (ref 150–450)
RBC # BLD AUTO: 3.83 X10E6/UL (ref 4.14–5.8)
WBC # BLD AUTO: 5.2 X10E3/UL (ref 3.4–10.8)

## 2021-04-28 ENCOUNTER — OFFICE VISIT (OUTPATIENT)
Dept: ONCOLOGY | Facility: CLINIC | Age: 71
End: 2021-04-28

## 2021-04-28 VITALS
HEART RATE: 59 BPM | TEMPERATURE: 97.1 F | WEIGHT: 208 LBS | DIASTOLIC BLOOD PRESSURE: 81 MMHG | BODY MASS INDEX: 26.69 KG/M2 | RESPIRATION RATE: 18 BRPM | SYSTOLIC BLOOD PRESSURE: 138 MMHG | HEIGHT: 74 IN

## 2021-04-28 DIAGNOSIS — D45 POLYCYTHEMIA VERA (HCC): Primary | ICD-10-CM

## 2021-04-28 PROCEDURE — 99213 OFFICE O/P EST LOW 20 MIN: CPT | Performed by: NURSE PRACTITIONER

## 2021-04-28 RX ORDER — MULTIVIT WITH MINERALS/LUTEIN
TABLET ORAL
COMMUNITY
Start: 2021-04-17

## 2021-04-28 NOTE — PROGRESS NOTES
"CHIEF COMPLAINT: Polycythemia vera    Problem List:  Oncology/Hematology History Overview Note   1.  SHELBY-2 mutation positive polycythemia vera  2.  Goal to maintain HCT at 45 or below   3.   Hydrea 1000 mg/day started 11/2/16         Polycythemia vera (CMS/HCC)   6/30/2016 Initial Diagnosis    Polycythemia vera         HISTORY OF PRESENT ILLNESS:  The patient is a 70 y.o. male, here for follow up on management of JAK2 positive polycythemia vera.  The patient has been doing well since we saw him last with no new concerns.  Continues on Hydrea 1000 mg daily with no unusual side effects.  No change in his health since we saw him last.  He has received his Covid vaccine.    Past Medical History:   Diagnosis Date   • Arthritis    • Erythrocytosis 10/20/2016   • Hypertension    • Polycythemia vera (CMS/HCC)      History reviewed. No pertinent surgical history.    No Known Allergies    Family History and Social History reviewed and changed as necessary    REVIEW OF SYSTEM:   No new concerns    PHYSICAL EXAM:  General: Pleasant, well-developed, well-nourished gentleman in no distress.  Respirations cold respirations even and unlabored.    Vitals:    04/28/21 0816   BP: 138/81   Pulse: 59   Resp: 18   Temp: 97.1 °F (36.2 °C)   Weight: 94.3 kg (208 lb)   Height: 188 cm (74\")     Vitals:    04/28/21 0816   PainSc: 0-No pain          ECOG score: 0       Vitals reviewed.      Lab Results   Component Value Date    HGB 14.6 04/16/2021    HCT 41.7 04/16/2021     (H) 04/16/2021     04/16/2021    WBC 5.2 04/16/2021    NEUTROABS 2.7 04/16/2021    LYMPHSABS 1.9 04/16/2021    MONOSABS 0.5 04/16/2021    EOSABS 0.0 04/16/2021    BASOSABS 0.0 04/16/2021             ASSESSMENT & PLAN:    1.  JAK2 positive polycythemia vera: Mr. Lainez continues to do well, tolerating Hydrea 1000 mg daily along with baby aspirin.  CBC as shown above is unremarkable.  We will continue Hydrea 1000 mg daily unchanged along with aspirin 81 mg daily. "  We will repeat CBC in 6 months prior to return and I have ordered that today.  He is up-to-date with routine health maintenance under the care of Dr. Delgado.    Return to clinic in 6 months for follow-up.    This was a level 3, limited MDM visit with 1 stable chronic illness, review of current CBC and ordering of future CBC, prescription drug management on Hydrea.  Garima Oswald, APRN    04/28/2021

## 2021-10-11 ENCOUNTER — TELEPHONE (OUTPATIENT)
Dept: ONCOLOGY | Facility: CLINIC | Age: 71
End: 2021-10-11

## 2021-10-11 NOTE — TELEPHONE ENCOUNTER
FRAN CORDERO RETURNING CALL , SAID LEFT VM BUT DIDN'T CHECK THE VOICEMAIL, THOUGHT MIGHT BE NOÉ , I ADVISED NO NOTES LEFT ON CHART WHERE NOÉ HAD CALLED, ADVISED TO CHECK THE VOICEMAIL. FRAN V/U AND WILL CHECK VM CALL BACK IF NEEDED.

## 2021-10-11 NOTE — TELEPHONE ENCOUNTER
Called patient back to tell him he can get labs done on the 15th.  Asked him to call back and let me know which [facility] he is going to so I can send order.  Had to leave voicemail.

## 2021-10-11 NOTE — TELEPHONE ENCOUNTER
Caller: FRAN CORDERO    Relationship: SELF    Best call back number: [phone]    What is the best time to reach you: ANYTIME    Who are you requesting to speak with (clinical staff, provider,  specific staff member):   NOÉ    Do you know the name of the person who called: FRAN    What was the call regarding:  HAS APPT ON 10/27, AND SUPPOSED TO GET BLOOD TEST DONE ON THE 18TH OR AFTER, WANTED TO SEE IF CAN GET LAB WORK DONE ON 10/15 USUALLY GOES TO [facility] , SINCE WILL BE OUT OF TOWN ON WEEK OF 10/18    Do you require a callback: YES      CAN LEAVE A VOICEMAIL WITH INFORMATION IF MISSES THE CALL.

## 2021-10-16 LAB
BASOPHILS # BLD AUTO: 0 X10E3/UL (ref 0–0.2)
BASOPHILS NFR BLD AUTO: 1 %
EOSINOPHIL # BLD AUTO: 0 X10E3/UL (ref 0–0.4)
EOSINOPHIL NFR BLD AUTO: 0 %
ERYTHROCYTE [DISTWIDTH] IN BLOOD BY AUTOMATED COUNT: 12.7 % (ref 11.6–15.4)
HCT VFR BLD AUTO: 41.1 % (ref 37.5–51)
HGB BLD-MCNC: 14.6 G/DL (ref 13–17.7)
IMM GRANULOCYTES # BLD AUTO: 0 X10E3/UL (ref 0–0.1)
IMM GRANULOCYTES NFR BLD AUTO: 0 %
LYMPHOCYTES # BLD AUTO: 2 X10E3/UL (ref 0.7–3.1)
LYMPHOCYTES NFR BLD AUTO: 36 %
MCH RBC QN AUTO: 39.2 PG (ref 26.6–33)
MCHC RBC AUTO-ENTMCNC: 35.5 G/DL (ref 31.5–35.7)
MCV RBC AUTO: 111 FL (ref 79–97)
MONOCYTES # BLD AUTO: 0.5 X10E3/UL (ref 0.1–0.9)
MONOCYTES NFR BLD AUTO: 9 %
NEUTROPHILS # BLD AUTO: 2.9 X10E3/UL (ref 1.4–7)
NEUTROPHILS NFR BLD AUTO: 54 %
PLATELET # BLD AUTO: 232 X10E3/UL (ref 150–450)
RBC # BLD AUTO: 3.72 X10E6/UL (ref 4.14–5.8)
WBC # BLD AUTO: 5.4 X10E3/UL (ref 3.4–10.8)

## 2021-10-27 ENCOUNTER — OFFICE VISIT (OUTPATIENT)
Dept: ONCOLOGY | Facility: CLINIC | Age: 71
End: 2021-10-27

## 2021-10-27 VITALS
RESPIRATION RATE: 18 BRPM | HEART RATE: 88 BPM | DIASTOLIC BLOOD PRESSURE: 81 MMHG | HEIGHT: 74 IN | WEIGHT: 210 LBS | TEMPERATURE: 97.6 F | OXYGEN SATURATION: 94 % | BODY MASS INDEX: 26.95 KG/M2 | SYSTOLIC BLOOD PRESSURE: 143 MMHG

## 2021-10-27 DIAGNOSIS — D45 POLYCYTHEMIA VERA (HCC): Primary | ICD-10-CM

## 2021-10-27 PROCEDURE — 99213 OFFICE O/P EST LOW 20 MIN: CPT | Performed by: NURSE PRACTITIONER

## 2021-10-27 NOTE — PROGRESS NOTES
"CHIEF COMPLAINT: Polycythemia vera    Problem List:  Oncology/Hematology History Overview Note   1.  SHELBY-2 mutation positive polycythemia vera  2.  Goal to maintain HCT at 45 or below   3.   Hydrea 1000 mg/day started 11/2/16         Polycythemia vera (HCC)   6/30/2016 Initial Diagnosis    Polycythemia vera         HISTORY OF PRESENT ILLNESS:  The patient is a 71 y.o. male, here for follow up on management of JAK2 positive polycythemia vera.  The patient has been doing well since we saw him last with no new concerns.  Continues on Hydrea 1000 mg daily with no unusual side effects.  No change in his health since we saw him last.  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    Past Medical History:   Diagnosis Date   • Arthritis    • Erythrocytosis 10/20/2016   • Hypertension    • Polycythemia vera (HCC)      History reviewed. No pertinent surgical history.    No Known Allergies    Family History and Social History reviewed and changed as necessary    REVIEW OF SYSTEM:   No new concerns    PHYSICAL EXAM:  General: Pleasant, well-developed, well-nourished gentleman in no distress.  Respirations: respirations even and unlabored.    Vitals:    10/27/21 0836   BP: 143/81   Pulse: 88   Resp: 18   Temp: 97.6 °F (36.4 °C)   SpO2: 94%   Weight: 95.3 kg (210 lb)   Height: 188 cm (74\")     Vitals:    10/27/21 0836   PainSc: 0-No pain          ECOG score: 0       Vitals reviewed.  Labs reviewed.    Lab Results   Component Value Date    HGB 14.6 10/15/2021    HCT 41.1 10/15/2021     (H) 10/15/2021     10/15/2021    WBC 5.4 10/15/2021    NEUTROABS 2.9 10/15/2021    LYMPHSABS 2.0 10/15/2021    MONOSABS 0.5 10/15/2021    EOSABS 0.0 10/15/2021    BASOSABS 0.0 10/15/2021             ASSESSMENT & PLAN:    1.  JAK2 positive polycythemia vera: Mr. Lainez continues to do well, tolerating Hydrea 1000 mg daily along with baby aspirin.  CBC as shown above is unremarkable.  We will continue Hydrea 1000 mg daily " unchanged along with aspirin 81 mg daily.  We will repeat CBC in 6 months prior to return and I have ordered that today.  He is up-to-date with routine health maintenance under the care of Dr. Delgado.    Return to clinic in 6 months for follow-up.    This was a level 3, limited MDM visit with stable chronic illness, review of labs, ordering of future labs and management of prescription drug therapy.    Garima Oswald, APRN    10/27/2021

## 2021-12-27 DIAGNOSIS — D45 POLYCYTHEMIA VERA (HCC): ICD-10-CM

## 2021-12-27 RX ORDER — HYDROXYUREA 500 MG/1
CAPSULE ORAL
Qty: 60 CAPSULE | Refills: 2 | Status: SHIPPED | OUTPATIENT
Start: 2021-12-27 | End: 2022-03-28

## 2022-03-28 DIAGNOSIS — D45 POLYCYTHEMIA VERA: ICD-10-CM

## 2022-03-28 RX ORDER — HYDROXYUREA 500 MG/1
CAPSULE ORAL
Qty: 60 CAPSULE | Refills: 2 | Status: SHIPPED | OUTPATIENT
Start: 2022-03-28 | End: 2022-05-04 | Stop reason: SDUPTHER

## 2022-05-04 ENCOUNTER — OFFICE VISIT (OUTPATIENT)
Dept: ONCOLOGY | Facility: CLINIC | Age: 72
End: 2022-05-04

## 2022-05-04 VITALS
DIASTOLIC BLOOD PRESSURE: 76 MMHG | SYSTOLIC BLOOD PRESSURE: 126 MMHG | OXYGEN SATURATION: 98 % | BODY MASS INDEX: 27.46 KG/M2 | RESPIRATION RATE: 18 BRPM | TEMPERATURE: 97.5 F | HEIGHT: 74 IN | WEIGHT: 214 LBS | HEART RATE: 55 BPM

## 2022-05-04 DIAGNOSIS — D45 POLYCYTHEMIA VERA: Primary | ICD-10-CM

## 2022-05-04 PROCEDURE — 99214 OFFICE O/P EST MOD 30 MIN: CPT | Performed by: NURSE PRACTITIONER

## 2022-05-04 RX ORDER — HYDROXYUREA 500 MG/1
1000 CAPSULE ORAL DAILY
Qty: 180 CAPSULE | Refills: 1 | Status: SHIPPED | OUTPATIENT
Start: 2022-05-04 | End: 2023-01-17 | Stop reason: SDUPTHER

## 2022-05-04 NOTE — PROGRESS NOTES
"CHIEF COMPLAINT:   1.  Polycythemia vera   2.  B12 deficiency    Problem List:  Oncology/Hematology History Overview Note   1.  SHELBY-2 mutation positive polycythemia vera  2.  Goal to maintain HCT at 45 or below   3.   Hydrea 1000 mg/day started 11/2/16         Polycythemia vera (HCC)   6/30/2016 Initial Diagnosis    Polycythemia vera         HISTORY OF PRESENT ILLNESS:  The patient is a 71 y.o. male, here for follow up on management of JAK2 positive polycythemia vera currently on Hydrea 1000 mg daily since November 2016.  Mr. Lainez has been doing well since we saw him last overall.  Continues on Hydrea 1000 mg daily with no unusual side effects.  No change in his health since we saw him last.  He remains active, but he continues to report fatigue particularly in the evenings.  He reports a little more dyspepsia recently and has been taking antacids.  He recently had some additional blood work ordered by Dr. Delgado and was found to be vitamin B12 deficient.  Has not started replacement.    Past Medical History:   Diagnosis Date   • Arthritis    • Erythrocytosis 10/20/2016   • Hypertension    • Polycythemia vera (HCC)      No past surgical history on file.    No Known Allergies    Family History and Social History reviewed and changed as necessary    REVIEW OF SYSTEM:   Positive for mild fatigue, worse in the evenings  Positive for dyspepsia    PHYSICAL EXAM:  General: Pleasant, well-developed, well-nourished gentleman in no distress.  Respirations even and unlabored.    Vitals:    05/04/22 0822   BP: 126/76   Pulse: 55   Resp: 18   Temp: 97.5 °F (36.4 °C)   SpO2: 98%   Weight: 97.1 kg (214 lb)   Height: 188 cm (74\")     Vitals:    05/04/22 0822   PainSc: 0-No pain          ECOG score: 0       Vitals reviewed.  Labs reviewed.  4/25/2022 CBC WBC 6100, hemoglobin 14.4, hematocrit 40.6%, .8, MCH 38.6, MCHC 35.5, platelets 243,000, absolute lymphocytes 2074, absolute neutrophils 3361.  CMP glucose 85, BUN 8, " creatinine 0.72, sodium 137, potassium 3.9, calcium 9.3, total protein 6.4, albumin 4.1, alkaline phosphatase 71, AST 30, ALT 27.  TSH 2.35.  Vitamin B12 177, methylmalonic acid 1488.        ASSESSMENT & PLAN:    1.  JAK2 positive polycythemia vera:  Mr. Lainez continues to tolerate therapy with Hydrea 1000 mg daily along with baby aspirin.  He has been on Hydrea since November 2017.  CBC as shown above is unremarkable with no anemia, normal white blood cell count and platelet count.  He will continue Hydrea 1000 mg daily unchanged along with aspirin 81 mg daily.  We will repeat CBC in 6 months prior to return and I have ordered that today.      2.  Vitamin B12 deficiency: Recent labs with Dr. Delgado shows his B12 low at 177 and elevated methylmalonic acid of 1488.  We discussed today that I do not feel this is related to his Hydrea therapy or his polycythemia.  I recommended that he begin B12 replacement with Dr. Delgado, if he is unable to arrange B12 injections through his office we will be glad to set that up here.  I also recommended he talk to Dr. Delgado about possible EGD.  He has had a little more dyspepsia, we discussed that an EGD may be helpful in checking as to why he may be B12 deficient.  He is not a strict vegetarian, he reports that he does not eat red meat but he does eat deer meat.  He is not on any medications that I can tell that would impair absorption.    Return to clinic in 6 months for follow-up.    I spent 30 minutes caring for Michi on this date of service. This time includes time spent by me in the following activities: preparing for the visit, reviewing tests, performing a medically appropriate examination and/or evaluation, counseling and educating the patient/family/caregiver, ordering medications, tests, or procedures and documenting information in the medical record.     Garima Oswald, APRN    05/04/2022

## 2022-10-21 ENCOUNTER — TELEPHONE (OUTPATIENT)
Dept: ONCOLOGY | Facility: CLINIC | Age: 72
End: 2022-10-21

## 2022-10-21 NOTE — TELEPHONE ENCOUNTER
Caller: Michi Lainez    Relationship: Self          Who are you requesting to speak with (clinical staff, provider,  specific staff member): NOÉ        What was the call regarding:     WANTED TO LET HER KNOW WILL BE STOPPING BY [place] OFFICE  IN COMING WEEKS TO LEAVE A NOTE FOR MARKO GARCIA TO HAVE BEFORE HIS APPT FROM ANOTHER DR.     Do you require a callback: YES

## 2022-11-10 ENCOUNTER — OFFICE VISIT (OUTPATIENT)
Dept: ONCOLOGY | Facility: CLINIC | Age: 72
End: 2022-11-10

## 2022-11-10 VITALS
SYSTOLIC BLOOD PRESSURE: 157 MMHG | OXYGEN SATURATION: 98 % | DIASTOLIC BLOOD PRESSURE: 78 MMHG | RESPIRATION RATE: 20 BRPM | BODY MASS INDEX: 26.69 KG/M2 | WEIGHT: 208 LBS | HEART RATE: 65 BPM | TEMPERATURE: 97.7 F | HEIGHT: 74 IN

## 2022-11-10 DIAGNOSIS — Z79.899 ENCOUNTER FOR LONG-TERM CURRENT USE OF HIGH RISK MEDICATION: ICD-10-CM

## 2022-11-10 DIAGNOSIS — D45 POLYCYTHEMIA VERA: Primary | ICD-10-CM

## 2022-11-10 PROCEDURE — 99214 OFFICE O/P EST MOD 30 MIN: CPT | Performed by: NURSE PRACTITIONER

## 2022-11-10 RX ORDER — CYANOCOBALAMIN 1000 UG/ML
INJECTION, SOLUTION INTRAMUSCULAR; SUBCUTANEOUS
COMMUNITY
Start: 2022-08-10

## 2022-11-10 RX ORDER — AMOXICILLIN 500 MG/1
500 CAPSULE ORAL 2 TIMES DAILY
Qty: 20 CAPSULE | Refills: 0 | Status: SHIPPED | OUTPATIENT
Start: 2022-11-10

## 2022-11-10 NOTE — PROGRESS NOTES
CHIEF COMPLAINT:   1.  Polycythemia vera   2.  B12 deficiency    Problem List:  Oncology/Hematology History Overview Note   1.  SHELBY-2 mutation positive polycythemia vera  A) Goal to maintain HCT at 45 or below   B) Hydrea 1000 mg/day started 11/2/16    2.  Vitamin B12 deficiency     -5/4/2022 [facility] Oncology clinic follow-up:  Mr. Lainez continues to tolerate therapy with Hydrea 1000 mg daily along with baby aspirin.  He has been on Hydrea since November 2017.  CBC as shown above is unremarkable with no anemia, normal white blood cell count and platelet count.  He will continue Hydrea 1000 mg daily unchanged along with aspirin 81 mg daily.  We will repeat CBC in 6 months prior to return and I have ordered that today.  Recent labs with Dr. Delgado shows his B12 low at 177 and elevated methylmalonic acid of 1488.  We discussed today that I do not feel this is related to his Hydrea therapy or his polycythemia.  I recommended that he begin B12 replacement with Dr. Delgado, if he is unable to arrange B12 injections through his office we will be glad to set that up here.  I also recommended he talk to Dr. Delgado about possible EGD.  He has had a little more dyspepsia, we discussed that an EGD may be helpful in checking as to why he may be B12 deficient.  He is not a strict vegetarian, he reports that he does not eat red meat but he does eat deer meat.  He is not on any medications that I can tell that would impair absorption.     Polycythemia vera ([facility])   6/30/2016 Initial Diagnosis    Polycythemia vera         HISTORY OF PRESENT ILLNESS:  The patient is a 72 y.o. male, here for follow up on management of JAK2 positive polycythemia vera currently on Hydrea 1000 mg daily since November 2016.  Mr. Lainez has been doing well since we saw him last with no new concerns.  Continues to receive B12 injections with Dr. Delgado's office.  Reports that he has intermittent heartburn still, saw Dr. Delgado earlier today and is going to  "start a new medication, sounds like it is most likely a PPI.  Tolerating Hydrea 1000 mg daily along with baby aspirin with no unusual side effects.  Counts have been good.  He is getting ready to leave for [place] and is requesting a prescription for an antibiotic to have with him in case he develops any infectious symptoms while he is gone.    Past Medical History:   Diagnosis Date   • Arthritis    • Erythrocytosis 10/20/2016   • Hypertension    • Polycythemia vera (HCC)      No past surgical history on file.    No Known Allergies    Family History and Social History reviewed and changed as necessary    REVIEW OF SYSTEM:   Positive for mild fatigue  Positive for dyspepsia    PHYSICAL EXAM:  General: Pleasant, well-developed, well-nourished gentleman in no distress.  Respirations even and unlabored.    Vitals:    11/10/22 1332   BP: 157/78   Pulse: 65   Resp: 20   Temp: 97.7 °F (36.5 °C)   SpO2: 98%   Weight: 94.3 kg (208 lb)   Height: 188 cm (74\")     Vitals:    11/10/22 1332   PainSc: 0-No pain          ECOG score: 0       Vitals reviewed.  Labs reviewed.  10/19/2022 CBC WBC 7300, hemoglobin 15.4, hematocrit 43.8%, , platelet count 258,000, normal differential with ANC 4500.  CMP normal, glucose 84, BUN 9, creatinine 0.78, sodium 141, potassium 4.1, calcium 9.5, total bilirubin 0.4, alkaline phosphatase 85, AST 25, ALT 21.  B12 402.  TSH 3.320.      ASSESSMENT & PLAN:    1.  JAK2 positive polycythemia vera:  Mr. Lainez continues to do well on Hydrea 1000 mg daily along with baby aspirin.  His current CBC on 10/19/2022 as shown above was unremarkable.  MCV is elevated at 110, this is a common side effect of Hydrea with macrocytosis with MCV >97% and 42% of those taking it.  He will continue Hydrea unchanged.  He is getting ready to travel to [place], I have sent a prescription for amoxicillin 500 mg 1 tablet twice daily, #20 with 0 refills as he requested something to take with him in case he develops any " symptoms of a URI.  We will repeat CBC again in 6 months prior to return and I have ordered that today.    2.  Vitamin B12 deficiency: He continues on B12 injections monthly with Dr. Delgado, his most recent labs show his B12 now normal at 402.  He will continue his B12 injections.    Return to clinic in 6 months for follow-up.    This was a level 4, moderate MDM visit with 2 or more stable chronic illnesses and prescription drug management.    Garima Oswald, APRN    11/10/2022

## 2023-01-17 DIAGNOSIS — D45 POLYCYTHEMIA VERA: ICD-10-CM

## 2023-01-17 RX ORDER — HYDROXYUREA 500 MG/1
1000 CAPSULE ORAL DAILY
Qty: 180 CAPSULE | Refills: 0 | Status: SHIPPED | OUTPATIENT
Start: 2023-01-17

## 2023-04-15 DIAGNOSIS — D45 POLYCYTHEMIA VERA: ICD-10-CM

## 2023-04-17 RX ORDER — HYDROXYUREA 500 MG/1
CAPSULE ORAL
Qty: 180 CAPSULE | Refills: 0 | Status: SHIPPED | OUTPATIENT
Start: 2023-04-17

## 2023-04-20 DIAGNOSIS — D45 POLYCYTHEMIA VERA: ICD-10-CM

## 2023-04-20 RX ORDER — HYDROXYUREA 500 MG/1
CAPSULE ORAL
Refills: 0 | OUTPATIENT
Start: 2023-04-20

## 2023-04-25 ENCOUNTER — OFFICE VISIT (OUTPATIENT)
Dept: ONCOLOGY | Facility: CLINIC | Age: 73
End: 2023-04-25
Payer: MEDICARE

## 2023-04-25 VITALS
RESPIRATION RATE: 20 BRPM | HEART RATE: 54 BPM | DIASTOLIC BLOOD PRESSURE: 85 MMHG | SYSTOLIC BLOOD PRESSURE: 137 MMHG | WEIGHT: 210 LBS | BODY MASS INDEX: 26.95 KG/M2 | HEIGHT: 74 IN | TEMPERATURE: 97.1 F

## 2023-04-25 DIAGNOSIS — D45 POLYCYTHEMIA VERA: Primary | ICD-10-CM

## 2023-04-25 PROCEDURE — 99214 OFFICE O/P EST MOD 30 MIN: CPT | Performed by: INTERNAL MEDICINE

## 2023-04-25 PROCEDURE — 1126F AMNT PAIN NOTED NONE PRSNT: CPT | Performed by: INTERNAL MEDICINE

## 2023-04-25 PROCEDURE — 1159F MED LIST DOCD IN RCRD: CPT | Performed by: INTERNAL MEDICINE

## 2023-04-25 PROCEDURE — 1160F RVW MEDS BY RX/DR IN RCRD: CPT | Performed by: INTERNAL MEDICINE

## 2023-04-25 RX ORDER — CLONAZEPAM 1 MG/1
TABLET ORAL
COMMUNITY
Start: 2023-01-23

## 2023-04-25 NOTE — PROGRESS NOTES
CHIEF COMPLAINT: No new somatic complaints    Problem List:  Oncology/Hematology History Overview Note   1.  SHELBY-2 mutation positive polycythemia vera  A) Goal to maintain HCT at 45 or below   B) Hydrea 1000 mg/day started 11/2/16    2.  Vitamin B12 deficiency   -receives B12 injections with Dr. Delgado    -5/4/[facility]22 [facility] [place] [facility] follow-up:  Mr. Lainez continues to tolerate therapy with Hydrea 1000 mg daily along with baby aspirin.  He has been on Hydrea since November 2017.  CBC as shown above is unremarkable with no anemia, normal white blood cell count and platelet count.  He will continue Hydrea 1000 mg daily unchanged along with aspirin 81 mg daily.  We will repeat CBC in 6 months prior to return and I have ordered that today.  Recent labs with Dr. Delgado shows his B12 low at 177 and elevated methylmalonic acid of 1488.  We discussed today that I do not feel this is related to his Hydrea therapy or his polycythemia.  I recommended that he begin B12 replacement with Dr. Delgado, if he is unable to arrange B12 injections through his office we will be glad to set that up here.  I also recommended he talk to Dr. Delgado about possible EGD.  He has had a little more dyspepsia, we discussed that an EGD may be helpful in checking as to why he may be B12 deficient.  He is not a strict vegetarian, he reports that he does not eat red meat but he does eat deer meat.  He is not on any medications that I can tell that would impair absorption.    -11/10/2022 [facility] [place] hematology APRN follow-up:  JAK2 positive polycythemia vera:  Mr. Lainez continues to do well on Hydrea 1000 mg daily along with baby aspirin.  His current CBC on 10/19/2022 as shown above was unremarkable.  MCV is elevated at 110, this is a common side effect of Hydrea with macrocytosis with MCV >97% and 42% of those taking it.  He will continue Hydrea unchanged.  He is getting ready to travel to [place], I have sent a prescription for amoxicillin 500 mg 1  "tablet twice daily, #20 with 0 refills as he requested something to take with him in case he develops any symptoms of a URI.  We will repeat CBC again in 6 months prior to return and I have ordered that today.  He continues on B12 injections monthly with Dr. Delgado, his most recent labs show his B12 now normal at 402.  He will continue his B12 injections.    --1/17/2023 white count 6600 hemoglobin 14.2  unremarkable differential.  Platelets 232,000.  B12 over 2000.  Folate normal 12.2.  -4/19/2023 white count 5200 hemoglobin 14.8 platelets 225,000     Polycythemia vera   6/30/2016 Initial Diagnosis    Polycythemia vera         HISTORY OF PRESENT ILLNESS:  The patient is a 72 y.o. male, here for follow up on management of follow-up of PRV with history of B12 deficiency resolved with injections with Dr. Delgado    Past Medical History:   Diagnosis Date   • Arthritis    • Erythrocytosis 10/20/2016   • Hypertension    • Polycythemia vera      No past surgical history on file.    No Known Allergies    Family History and Social History reviewed and changed as necessary    REVIEW OF SYSTEM:   No new somatic complaints    PHYSICAL EXAM:  No rashes.  No petechiae or ecchymoses.  No palpable hepatosplenomegaly    Vitals:    04/25/23 0829   BP: 137/85   Pulse: 54   Resp: 20   Temp: 97.1 °F (36.2 °C)   Weight: 95.3 kg (210 lb)   Height: 188 cm (74\")     Vitals:    04/25/23 0829   PainSc: 0-No pain          ECOG score: 0           Vitals reviewed.    ECOG: (0) Fully Active - Able to Carry On All Pre-disease Performance Without Restriction    Lab Results   Component Value Date    HGB 14.6 10/15/2021    HCT 41.1 10/15/2021     (H) 10/15/2021     10/15/2021    WBC 5.4 10/15/2021    NEUTROABS 2.9 10/15/2021    LYMPHSABS 2.0 10/15/2021    MONOSABS 0.5 10/15/2021    EOSABS 0.0 10/15/2021    BASOSABS 0.0 10/15/2021       Lab Results   Component Value Date    GLUCOSE 96 10/24/2019    BUN 15 10/24/2019    CREATININE " 0.79 10/24/2019     10/24/2019    K 4.4 10/24/2019     10/24/2019    CO2 26 10/24/2019    CALCIUM 9.5 10/24/2019    ALBUMIN 4.3 10/24/2019    BILITOT 0.4 10/24/2019    ALKPHOS 74 10/24/2019    AST 22 10/24/2019    ALT 20 10/24/2019             ASSESSMENT & PLAN:  1.  SHELBY-2 mutation positive polycythemia vera  A) Goal to maintain HCT at 45 or below   B) Hydrea 1000 mg/day started 11/2/16    2.  Vitamin B12 deficiency   -receives B12 injections with Dr. Delgado    -5/4/2022 [facility] clinic follow-up:  Mr. Lainez continues to tolerate therapy with Hydrea 1000 mg daily along with baby aspirin.  He has been on Hydrea since November 2017.  CBC as shown above is unremarkable with no anemia, normal white blood cell count and platelet count.  He will continue Hydrea 1000 mg daily unchanged along with aspirin 81 mg daily.  We will repeat CBC in 6 months prior to return and I have ordered that today.  Recent labs with Dr. Delgado shows his B12 low at 177 and elevated methylmalonic acid of 1488.  We discussed today that I do not feel this is related to his Hydrea therapy or his polycythemia.  I recommended that he begin B12 replacement with Dr. Delgado, if he is unable to arrange B12 injections through his office we will be glad to set that up here.  I also recommended he talk to Dr. Delgado about possible EGD.  He has had a little more dyspepsia, we discussed that an EGD may be helpful in checking as to why he may be B12 deficient.  He is not a strict vegetarian, he reports that he does not eat red meat but he does eat deer meat.  He is not on any medications that I can tell that would impair absorption.    -11/10/2022 [facility] hematology APRN follow-up:  JAK2 positive polycythemia vera:  Mr. Lainez continues to do well on Hydrea 1000 mg daily along with baby aspirin.  His current CBC on 10/19/2022 as shown above was unremarkable.  MCV is elevated at 110, this is a common side effect of Hydrea with macrocytosis  with MCV >97% and 42% of those taking it.  He will continue Hydrea unchanged.  He is getting ready to travel to [facility], I have sent a prescription for amoxicillin 500 mg 1 tablet twice daily, #20 with 0 refills as he requested something to take with him in case he develops any symptoms of a URI.  We will repeat CBC again in 6 months prior to return and I have ordered that today.  He continues on B12 injections monthly with Dr. Delgado, his most recent labs show his B12 now normal at 402.  He will continue his B12 injections.    --1/17/2023 white count 6600 hemoglobin 14.2  unremarkable differential.  Platelets 232,000.  B12 over 2000.  Folate normal 12.2.  -4/19/2023 white count 5200 hemoglobin 14.8 platelets 225,000    -4/25/2023 [facility] oncology clinic follow-up: I reviewed the above labs with the patient.  He is doing very well on his periodic B12 shots with primary care and Hydrea 1000 mg daily along with baby aspirin.  We will check his CBC in 3 months and prior to return to my nurse practitioner in 6 months.    Total time of care today inclusive of time spent today prior to his arrival reviewing interval notes from my nurse practitioner and during visit translating interval data to the patient and putting forth a plan as outlined in after visit instituting this plan took 30 minutes of patient care time throughout the day today.  Abdelrahman Santos MD    04/25/2023

## 2023-10-14 DIAGNOSIS — D45 POLYCYTHEMIA VERA: ICD-10-CM

## 2023-10-16 ENCOUNTER — SPECIALTY PHARMACY (OUTPATIENT)
Dept: ONCOLOGY | Facility: HOSPITAL | Age: 73
End: 2023-10-16
Payer: MEDICARE

## 2023-10-16 RX ORDER — HYDROXYUREA 500 MG/1
1000 CAPSULE ORAL DAILY
Qty: 60 CAPSULE | Refills: 11 | Status: SHIPPED | OUTPATIENT
Start: 2023-10-16

## 2023-10-16 NOTE — PROGRESS NOTES
Re: Refills of Oral Specialty Medication - Hydrea (hydroxyurea)    Drug-Drug Interactions: The current medication list was reviewed and there are no relevant drug-drug interactions with the specialty medication.  Medication Allergies: The patient has NKDA  Review of Labs/Dose Adjustments: NO DOSE CHANGE - I reviewed the most recent note and labs and the patient will continue without any dose changes.  I sent refills as described below.    Drug: Hydrea (hydroxyurea)  Strength: 500 mg  Directions: Take 2 capsule by mouth daily  Quantity: 60  Refills: 11  Pharmacy prescription sent to: [facility] in [place] ([facility])    Karey Bah, PharmD, BCOP  Oncology Clinical Pharmacist   Phone: [phone]      10/16/2023  08:03 EDT

## 2023-11-01 ENCOUNTER — TELEMEDICINE (OUTPATIENT)
Dept: ONCOLOGY | Facility: CLINIC | Age: 73
End: 2023-11-01
Payer: MEDICARE

## 2023-11-01 VITALS
BODY MASS INDEX: 26.71 KG/M2 | DIASTOLIC BLOOD PRESSURE: 80 MMHG | SYSTOLIC BLOOD PRESSURE: 132 MMHG | TEMPERATURE: 97.2 F | WEIGHT: 208 LBS

## 2023-11-01 DIAGNOSIS — Z79.899 ENCOUNTER FOR LONG-TERM CURRENT USE OF HIGH RISK MEDICATION: ICD-10-CM

## 2023-11-01 DIAGNOSIS — D45 POLYCYTHEMIA VERA: Primary | ICD-10-CM

## 2023-11-01 NOTE — PROGRESS NOTES
This was an audio and video enabled telemedicine encounter via [employer].      CHIEF COMPLAINT:  Getting over URI    Problem List:  Oncology/Hematology History Overview Note   1.  SHELBY-2 mutation positive polycythemia vera  A) Goal to maintain HCT at 45 or below   B) Hydrea 1000 mg/day started 11/2/16    2.  Vitamin B12 deficiency   -receives B12 injections with Dr. Delgado    -5/4/2022 [facility] Oncology clinic follow-up:  Mr. Lainez continues to tolerate therapy with Hydrea 1000 mg daily along with baby aspirin.  He has been on Hydrea since November 2017.  CBC as shown above is unremarkable with no anemia, normal white blood cell count and platelet count.  He will continue Hydrea 1000 mg daily unchanged along with aspirin 81 mg daily.  We will repeat CBC in 6 months prior to return and I have ordered that today.  Recent labs with Dr. Delgado shows his B12 low at 177 and elevated methylmalonic acid of 1488.  We discussed today that I do not feel this is related to his Hydrea therapy or his polycythemia.  I recommended that he begin B12 replacement with Dr. Delgado, if he is unable to arrange B12 injections through his office we will be glad to set that up here.  I also recommended he talk to Dr. Delgado about possible EGD.  He has had a little more dyspepsia, we discussed that an EGD may be helpful in checking as to why he may be B12 deficient.  He is not a strict vegetarian, he reports that he does not eat red meat but he does eat deer meat.  He is not on any medications that I can tell that would impair absorption.    -11/10/2022 [facility] hematology APRN follow-up:  JAK2 positive polycythemia vera:  Mr. Lainez continues to do well on Hydrea 1000 mg daily along with baby aspirin.  His current CBC on 10/19/2022 as shown above was unremarkable.  MCV is elevated at 110, this is a common side effect of Hydrea with macrocytosis with MCV >97% and 42% of those taking it.  He will continue Hydrea unchanged.  He is getting ready to  travel to [place], I have sent a prescription for amoxicillin 500 mg 1 tablet twice daily, #20 with 0 refills as he requested something to take with him in case he develops any symptoms of a URI.  We will repeat CBC again in 6 months prior to return and I have ordered that today.  He continues on B12 injections monthly with Dr. Delgado, his most recent labs show his B12 now normal at 402.  He will continue his B12 injections.    --1/17/2023 white count 6600 hemoglobin 14.2  unremarkable differential.  Platelets 232,000.  B12 over 2000.  Folate normal 12.2.  -4/19/2023 white count 5200 hemoglobin 14.8 platelets 225,000    -4/25/2023 [facility] oncology clinic follow-up: I reviewed the above labs with the patient.  He is doing very well on his periodic B12 shots with primary care and Hydrea 1000 mg daily along with baby aspirin.  We will check his CBC in 3 months and prior to return to my nurse practitioner in 6 months.     Polycythemia vera   6/30/2016 Initial Diagnosis    Polycythemia vera         HISTORY OF PRESENT ILLNESS:  The patient is a 73 y.o. male, here for follow up on management of follow-up of PRV with history of B12 deficiency receiving B12 injections with Dr. Delgado.  Mr. Lainez reports he is recovering from an upper respiratory infection since returning from [place] recently.  Currently on doxycycline and feels he is getting better.  Afebrile as of this morning with temperature 97.2.  Denies getting frequent infections.  Overall has been feeling well.  Tolerating Hydrea 1000 mg daily.    Past Medical History:   Diagnosis Date    Arthritis     Erythrocytosis 10/20/2016    Hypertension     Polycythemia vera      No past surgical history on file.    No Known Allergies    Family History and Social History reviewed and changed as necessary    REVIEW OF SYSTEM:   Cough with recent URI  Denies any rash    PHYSICAL EXAM:  On video, patient is a well-developed, well-nourished healthy-appearing male in no  distress.  Conversation is appropriate, he is able to maintain conversation with no respiratory difficulty.  No rash.    Vitals:    11/01/23 1012   BP: 132/80   Temp: 97.2 °F (36.2 °C)   Weight: 94.3 kg (208 lb)          Vitals reviewed.  Labs reviewed    ECOG: (0) Fully Active - Able to Carry On All Pre-disease Performance Without Restriction    Lab Results   Component Value Date    HGB 14.6 10/15/2021    HCT 41.1 10/15/2021     (H) 10/15/2021     10/15/2021    WBC 5.4 10/15/2021    NEUTROABS 2.9 10/15/2021    LYMPHSABS 2.0 10/15/2021    MONOSABS 0.5 10/15/2021    EOSABS 0.0 10/15/2021    BASOSABS 0.0 10/15/2021       Lab Results   Component Value Date    GLUCOSE 96 10/24/2019    BUN 15 10/24/2019    CREATININE 0.79 10/24/2019     10/24/2019    K 4.4 10/24/2019     10/24/2019    CO2 26 10/24/2019    CALCIUM 9.5 10/24/2019    ALBUMIN 4.3 10/24/2019    BILITOT 0.4 10/24/2019    ALKPHOS 74 10/24/2019    AST 22 10/24/2019    ALT 20 10/24/2019     10/27/2023 CBC reviewed at time of visit, WBC 7.6, hemoglobin 14.2, hematocrit 40.6%, platelet count 215,000.        ASSESSMENT & PLAN:  1.  SHELBY-2 mutation positive polycythemia vera  A) Goal to maintain HCT at 45 or below   B) Hydrea 1000 mg/day started 11/2/16    2.  Vitamin B12 deficiency   -receives B12 injections with Dr. Delgado    -5/4/2022 [facility] follow-up:  Mr. Lainez continues to tolerate therapy with Hydrea 1000 mg daily along with baby aspirin.  He has been on Hydrea since November 2017.  CBC as shown above is unremarkable with no anemia, normal white blood cell count and platelet count.  He will continue Hydrea 1000 mg daily unchanged along with aspirin 81 mg daily.  We will repeat CBC in 6 months prior to return and I have ordered that today.  Recent labs with Dr. Delgado shows his B12 low at 177 and elevated methylmalonic acid of 1488.  We discussed today that I do not feel this is related to his Hydrea therapy or his  polycythemia.  I recommended that he begin B12 replacement with Dr. Delgado, if he is unable to arrange B12 injections through his office we will be glad to set that up here.  I also recommended he talk to Dr. Delgado about possible EGD.  He has had a little more dyspepsia, we discussed that an EGD may be helpful in checking as to why he may be B12 deficient.  He is not a strict vegetarian, he reports that he does not eat red meat but he does eat deer meat.  He is not on any medications that I can tell that would impair absorption.    -11/10/2022 [facility] hematology APRN follow-up:  JAK2 positive polycythemia vera:  Mr. Lainez continues to do well on Hydrea 1000 mg daily along with baby aspirin.  His current CBC on 10/19/2022 as shown above was unremarkable.  MCV is elevated at 110, this is a common side effect of Hydrea with macrocytosis with MCV >97% and 42% of those taking it.  He will continue Hydrea unchanged.  He is getting ready to travel to [facility], I have sent a prescription for amoxicillin 500 mg 1 tablet twice daily, #20 with 0 refills as he requested something to take with him in case he develops any symptoms of a URI.  We will repeat CBC again in 6 months prior to return and I have ordered that today.  He continues on B12 injections monthly with Dr. Delgado, his most recent labs show his B12 now normal at 402.  He will continue his B12 injections.    --1/17/2023 white count 6600 hemoglobin 14.2  unremarkable differential.  Platelets 232,000.  B12 over 2000.  Folate normal 12.2.  -4/19/2023 white count 5200 hemoglobin 14.8 platelets 225,000    -4/25/2023 [facility] oncology clinic follow-up: I reviewed the above labs with the patient.  He is doing very well on his periodic B12 shots with primary care and Hydrea 1000 mg daily along with baby aspirin.  We will check his CBC in 3 months and prior to return to my nurse practitioner in 6 months.    -11/1/2023 [facility] hematology/oncology clinic telemedicine  follow-up: Telemedicine visit was done today due to patient having upper respiratory infection.  He feels that he is getting better, he is on doxycycline prescribed by his PCP.  Afebrile today.  CBC reviewed from 10/27/2023 was unremarkable with normal WBC 7.6, hemoglobin 14.2, hematocrit 40.6%, platelet count 215,000.  He continues on Hydrea 1000 mg daily for his PRV.  Other than for current URI he denies frequent infections.  We discussed the importance to let us know if he did develop more frequent infections.  We will continue with monitoring CBC every 3 months and I have ordered that today, we will see him back in clinic for follow-up in 6 months.    I spent 20 minutes caring for Michi on this date of service. This time includes time spent by me in the following activities: preparing for the visit, reviewing tests, performing a medically appropriate examination and/or evaluation, ordering medications, tests, or procedures, documenting information in the medical record, and independently interpreting results and communicating that information with the patient/family/caregiver.     Garima Oswald, APRN    11/01/2023

## 2023-11-03 ENCOUNTER — SPECIALTY PHARMACY (OUTPATIENT)
Dept: ONCOLOGY | Facility: HOSPITAL | Age: 73
End: 2023-11-03
Payer: MEDICARE

## 2023-11-06 ENCOUNTER — TRANSCRIBE ORDERS (OUTPATIENT)
Dept: GENERAL RADIOLOGY | Facility: CLINIC | Age: 73
End: 2023-11-06
Payer: MEDICARE

## 2023-11-06 DIAGNOSIS — U07.1 COVID-19: Primary | ICD-10-CM

## 2023-11-06 DIAGNOSIS — R06.02 SOB (SHORTNESS OF BREATH): ICD-10-CM

## 2024-04-03 ENCOUNTER — SPECIALTY PHARMACY (OUTPATIENT)
Dept: ONCOLOGY | Facility: HOSPITAL | Age: 74
End: 2024-04-03
Payer: MEDICARE

## 2024-04-03 ENCOUNTER — OFFICE VISIT (OUTPATIENT)
Dept: ONCOLOGY | Facility: CLINIC | Age: 74
End: 2024-04-03
Payer: MEDICARE

## 2024-04-03 VITALS
BODY MASS INDEX: 27.08 KG/M2 | TEMPERATURE: 97.1 F | HEIGHT: 74 IN | SYSTOLIC BLOOD PRESSURE: 149 MMHG | DIASTOLIC BLOOD PRESSURE: 83 MMHG | HEART RATE: 60 BPM | RESPIRATION RATE: 18 BRPM | WEIGHT: 211 LBS

## 2024-04-03 DIAGNOSIS — D45 POLYCYTHEMIA VERA: Primary | ICD-10-CM

## 2024-04-03 PROCEDURE — 99213 OFFICE O/P EST LOW 20 MIN: CPT | Performed by: NURSE PRACTITIONER

## 2024-04-03 PROCEDURE — 1159F MED LIST DOCD IN RCRD: CPT | Performed by: NURSE PRACTITIONER

## 2024-04-03 PROCEDURE — 1160F RVW MEDS BY RX/DR IN RCRD: CPT | Performed by: NURSE PRACTITIONER

## 2024-04-03 PROCEDURE — 1126F AMNT PAIN NOTED NONE PRSNT: CPT | Performed by: NURSE PRACTITIONER

## 2024-04-03 NOTE — PROGRESS NOTES
CHIEF COMPLAINT: Polycythemia vera    Problem List:  Oncology/Hematology History Overview Note   1.  SHELBY-2 mutation positive polycythemia vera  A) Goal to maintain HCT at 45 or below   B) Hydrea 1000 mg/day started 11/2/16    2.  Vitamin B12 deficiency   -receives B12 injections with Dr. Delgado    -5/4/2022 [facility] Oncology clinic follow-up:  Mr. Lainez continues to tolerate therapy with Hydrea 1000 mg daily along with baby aspirin.  He has been on Hydrea since November 2017.  CBC as shown above is unremarkable with no anemia, normal white blood cell count and platelet count.  He will continue Hydrea 1000 mg daily unchanged along with aspirin 81 mg daily.  We will repeat CBC in 6 months prior to return and I have ordered that today.  Recent labs with Dr. Delgado shows his B12 low at 177 and elevated methylmalonic acid of 1488.  We discussed today that I do not feel this is related to his Hydrea therapy or his polycythemia.  I recommended that he begin B12 replacement with Dr. Delgado, if he is unable to arrange B12 injections through his office we will be glad to set that up here.  I also recommended he talk to Dr. Delgado about possible EGD.  He has had a little more dyspepsia, we discussed that an EGD may be helpful in checking as to why he may be B12 deficient.  He is not a strict vegetarian, he reports that he does not eat red meat but he does eat deer meat.  He is not on any medications that I can tell that would impair absorption.    -11/10/2022 [facility] hematology APRN follow-up:  JAK2 positive polycythemia vera:  Mr. Lainez continues to do well on Hydrea 1000 mg daily along with baby aspirin.  His current CBC on 10/19/2022 as shown above was unremarkable.  MCV is elevated at 110, this is a common side effect of Hydrea with macrocytosis with MCV >97% and 42% of those taking it.  He will continue Hydrea unchanged.  He is getting ready to travel to [place], I have sent a prescription for amoxicillin 500 mg 1 tablet  twice daily, #20 with 0 refills as he requested something to take with him in case he develops any symptoms of a URI.  We will repeat CBC again in 6 months prior to return and I have ordered that today.  He continues on B12 injections monthly with Dr. Delgado, his most recent labs show his B12 now normal at 402.  He will continue his B12 injections.    --1/17/2023 white count 6600 hemoglobin 14.2  unremarkable differential.  Platelets 232,000.  B12 over 2000.  Folate normal 12.2.  -4/19/2023 white count 5200 hemoglobin 14.8 platelets 225,000    -4/25/2023 [facility] oncology clinic follow-up: I reviewed the above labs with the patient.  He is doing very well on his periodic B12 shots with primary care and Hydrea 1000 mg daily along with baby aspirin.  We will check his CBC in 3 months and prior to return to my nurse practitioner in 6 months.     Polycythemia vera   6/30/2016 Initial Diagnosis    Polycythemia vera         HISTORY OF PRESENT ILLNESS:  The patient is a 73 y.o. male, here for follow up on management of follow-up of PRV with history of B12 deficiency receiving B12 injections with Dr. Delgado.  Mr. Lainez reports he still is dealing with some fatigue after having COVID in November.  He states it took him about 6 weeks or so to recover.  He still gets a little more fatigued with activity but overall is able to do his normal ADLs and continues to work part-time.  He is taking Hydrea 1000 mg daily and has not missed any doses.  He has had no new side effects that he is aware of.    Past Medical History:   Diagnosis Date    Arthritis     Erythrocytosis 10/20/2016    Hypertension     Polycythemia vera      No past surgical history on file.    No Known Allergies    Family History and Social History reviewed and changed as necessary    REVIEW OF SYSTEM:   Mild fatigue    PHYSICAL EXAM:  Well-developed, well-nourished healthy-appearing male in no distress  No cervical, supraclavicular or axillary nodes palpable  "on exam  Respirations regular and unlabored, lungs clear to auscultation bilaterally    Vitals:    04/03/24 0937   BP: 149/83   Pulse: 60   Resp: 18   Temp: 97.1 °F (36.2 °C)   Weight: 95.7 kg (211 lb)   Height: 188 cm (74\")          Vitals reviewed.  Labs reviewed    ECOG: (0) Fully Active - Able to Carry On All Pre-disease Performance Without Restriction    Lab Results   Component Value Date    HGB 14.6 10/15/2021    HCT 41.1 10/15/2021     (H) 10/15/2021     10/15/2021    WBC 5.4 10/15/2021    NEUTROABS 2.9 10/15/2021    LYMPHSABS 2.0 10/15/2021    MONOSABS 0.5 10/15/2021    EOSABS 0.0 10/15/2021    BASOSABS 0.0 10/15/2021       Lab Results   Component Value Date    GLUCOSE 96 10/24/2019    BUN 15 10/24/2019    CREATININE 0.79 10/24/2019     10/24/2019    K 4.4 10/24/2019     10/24/2019    CO2 26 10/24/2019    CALCIUM 9.5 10/24/2019    ALBUMIN 4.3 10/24/2019    BILITOT 0.4 10/24/2019    ALKPHOS 74 10/24/2019    AST 22 10/24/2019    ALT 20 10/24/2019     10/27/2023 CBC reviewed at time of visit, WBC 7.6, hemoglobin 14.2, hematocrit 40.6%, platelet count 215,000.        ASSESSMENT & PLAN:  1.  SHELBY-2 mutation positive polycythemia vera  A) Goal to maintain HCT at 45 or below   B) Hydrea 1000 mg/day started 11/2/16    2.  Vitamin B12 deficiency   -receives B12 injections with Dr. Delgado  3.  COVID infection November 2023    -5/4/2022 [facility] Oncology clinic follow-up:  Mr. Lainez continues to tolerate therapy with Hydrea 1000 mg daily along with baby aspirin.  He has been on Hydrea since November 2017.  CBC as shown above is unremarkable with no anemia, normal white blood cell count and platelet count.  He will continue Hydrea 1000 mg daily unchanged along with aspirin 81 mg daily.  We will repeat CBC in 6 months prior to return and I have ordered that today.  Recent labs with Dr. Delgado shows his B12 low at 177 and elevated methylmalonic acid of 1488.  We discussed today that I do not feel " this is related to his Hydrea therapy or his polycythemia.  I recommended that he begin B12 replacement with Dr. Delgado, if he is unable to arrange B12 injections through his office we will be glad to set that up here.  I also recommended he talk to Dr. Delgado about possible EGD.  He has had a little more dyspepsia, we discussed that an EGD may be helpful in checking as to why he may be B12 deficient.  He is not a strict vegetarian, he reports that he does not eat red meat but he does eat deer meat.  He is not on any medications that I can tell that would impair absorption.    -11/10/2022 [facility] hematology APRN follow-up:  JAK2 positive polycythemia vera:  Mr. Lainez continues to do well on Hydrea 1000 mg daily along with baby aspirin.  His current CBC on 10/19/2022 as shown above was unremarkable.  MCV is elevated at 110, this is a common side effect of Hydrea with macrocytosis with MCV >97% and 42% of those taking it.  He will continue Hydrea unchanged.  He is getting ready to travel to [place] [facility] [place] [facility]", I have sent a prescription for amoxicillin 500 mg 1 tablet twice daily, #20 with 0 refills as he requested something to take with him in case he develops any symptoms of a URI.  We will repeat CBC again in 6 months prior to return and I have ordered that today.  He continues on B12 injections monthly with Dr. Delgado, his most recent labs show his B12 now normal at 402.  He will continue his B12 injections.    --1/17/2023 white count 6600 hemoglobin 14.2  unremarkable differential.  Platelets 232,000.  B12 over 2000.  Folate normal 12.2.  -4/19/2023 white count 5200 hemoglobin 14.8 platelets 225,000    -4/25/2023 [facility] oncology clinic follow-up: I reviewed the above labs with the patient.  He is doing very well on his periodic B12 shots with primary care and Hydrea 1000 mg daily along with baby aspirin.  We will check his CBC in 3 months and prior to return to my nurse practitioner in 6 months.    -11/1/2023 [facility]  hematology/oncology clinic telemedicine follow-up: Telemedicine visit was done today due to patient having upper respiratory infection.  He feels that he is getting better, he is on doxycycline prescribed by his PCP.  Afebrile today.  CBC reviewed from 10/27/2023 was unremarkable with normal WBC 7.6, hemoglobin 14.2, hematocrit 40.6%, platelet count 215,000.  He continues on Hydrea 1000 mg daily for his PRV.  Other than for current URI he denies frequent infections.  We discussed the importance to let us know if he did develop more frequent infections.  We will continue with monitoring CBC every 3 months and I have ordered that today, we will see him back in clinic for follow-up in 6 months.    -4/3/2024 [facility] oncology/hematology clinic follow-up: Michi continues to do well on Hydrea 1000 mg daily.  I reviewed his CBC from 3/26/2024 and 1/22/2024, both were unremarkable, 3/26/2024 CBC with WBC 5.0, hemoglobin 14.5, hematocrit 41.3%, platelet count 213,000.  He will continue Hydrea unchanged.  We will see him back in 6 months for follow-up, he will continue CBC every 3 months.    This was a level 3, limited MDM visit with management of stable chronic illness, review of labs and prescription drug management.    Garima Oswald, RADHA    04/03/2024

## 2024-09-18 ENCOUNTER — SPECIALTY PHARMACY (OUTPATIENT)
Dept: ONCOLOGY | Facility: HOSPITAL | Age: 74
End: 2024-09-18
Payer: MEDICARE

## 2024-09-18 DIAGNOSIS — D45 POLYCYTHEMIA VERA: ICD-10-CM

## 2024-09-18 RX ORDER — HYDROXYUREA 500 MG/1
1000 CAPSULE ORAL DAILY
Qty: 60 CAPSULE | Refills: 11 | Status: SHIPPED | OUTPATIENT
Start: 2024-09-18

## 2024-10-08 ENCOUNTER — OFFICE VISIT (OUTPATIENT)
Dept: ONCOLOGY | Facility: CLINIC | Age: 74
End: 2024-10-08
Payer: MEDICARE

## 2024-10-08 VITALS
RESPIRATION RATE: 18 BRPM | TEMPERATURE: 97.3 F | HEART RATE: 58 BPM | WEIGHT: 213 LBS | BODY MASS INDEX: 27.34 KG/M2 | DIASTOLIC BLOOD PRESSURE: 78 MMHG | HEIGHT: 74 IN | SYSTOLIC BLOOD PRESSURE: 142 MMHG

## 2024-10-08 DIAGNOSIS — D45 POLYCYTHEMIA VERA: Primary | ICD-10-CM

## 2024-10-08 PROCEDURE — 1160F RVW MEDS BY RX/DR IN RCRD: CPT | Performed by: INTERNAL MEDICINE

## 2024-10-08 PROCEDURE — 99214 OFFICE O/P EST MOD 30 MIN: CPT | Performed by: INTERNAL MEDICINE

## 2024-10-08 PROCEDURE — 1159F MED LIST DOCD IN RCRD: CPT | Performed by: INTERNAL MEDICINE

## 2024-10-08 PROCEDURE — 1126F AMNT PAIN NOTED NONE PRSNT: CPT | Performed by: INTERNAL MEDICINE

## 2024-10-08 NOTE — PROGRESS NOTES
CHIEF COMPLAINT: No new somatic complaints    Problem List:  Oncology/Hematology History Overview Note   1.  SHELBY-2 mutation positive polycythemia vera  A) Goal to maintain HCT at 45 or below   B) Hydrea 1000 mg/day started 11/2/16    2.  Vitamin B12 deficiency   -receives B12 injections with Dr. Delgado    -5/4/2022 [facility] Oncology clinic follow-up:  Mr. Lainez continues to tolerate therapy with Hydrea 1000 mg daily along with baby aspirin.  He has been on Hydrea since November 2017.  CBC as shown above is unremarkable with no anemia, normal white blood cell count and platelet count.  He will continue Hydrea 1000 mg daily unchanged along with aspirin 81 mg daily.  We will repeat CBC in 6 months prior to return and I have ordered that today.  Recent labs with Dr. Delgado shows his B12 low at 177 and elevated methylmalonic acid of 1488.  We discussed today that I do not feel this is related to his Hydrea therapy or his polycythemia.  I recommended that he begin B12 replacement with Dr. Delgado, if he is unable to arrange B12 injections through his office we will be glad to set that up here.  I also recommended he talk to Dr. Delgado about possible EGD.  He has had a little more dyspepsia, we discussed that an EGD may be helpful in checking as to why he may be B12 deficient.  He is not a strict vegetarian, he reports that he does not eat red meat but he does eat deer meat.  He is not on any medications that I can tell that would impair absorption.    -11/10/2022 [facility] hematology APRN follow-up:  JAK2 positive polycythemia vera:  Mr. Lainez continues to do well on Hydrea 1000 mg daily along with baby aspirin.  His current CBC on 10/19/2022 as shown above was unremarkable.  MCV is elevated at 110, this is a common side effect of Hydrea with macrocytosis with MCV >97% and 42% of those taking it.  He will continue Hydrea unchanged.  He is getting ready to travel to [place], I have sent a prescription for amoxicillin 500 mg 1  tablet twice daily, #20 with 0 refills as he requested something to take with him in case he develops any symptoms of a URI.  We will repeat CBC again in 6 months prior to return and I have ordered that today.  He continues on B12 injections monthly with Dr. Delgado, his most recent labs show his B12 now normal at 402.  He will continue his B12 injections.    --1/17/2023 white count 6600 hemoglobin 14.2  unremarkable differential.  Platelets 232,000.  B12 over 2000.  Folate normal 12.2.  -4/19/2023 white count 5200 hemoglobin 14.8 platelets 225,000    -4/25/2023 [facility] oncology clinic follow-up: I reviewed the above labs with the patient.  He is doing very well on his periodic B12 shots with primary care and Hydrea 1000 mg daily along with baby aspirin.  We will check his CBC in 3 months and prior to return to my nurse practitioner in 6 months.    -11/1/2023 [facility] hematology/oncology clinic telemedicine follow-up: Telemedicine visit was done today due to patient having upper respiratory infection.  He feels that he is getting better, he is on doxycycline prescribed by his PCP.  Afebrile today.  CBC reviewed from 10/27/2023 was unremarkable with normal WBC 7.6, hemoglobin 14.2, hematocrit 40.6%, platelet count 215,000.  He continues on Hydrea 1000 mg daily for his PRV.  Other than for current URI he denies frequent infections.  We discussed the importance to let us know if he did develop more frequent infections.    -4/3/2024 [facility] oncology/hematology clinic follow-up: Michi continues to do well on Hydrea 1000 mg daily.  I reviewed his CBC from 3/26/2024 and 1/22/2024, both were unremarkable, 3/26/2024 CBC with WBC 5.0, hemoglobin 14.5, hematocrit 41.3%, platelet count 213,000.  He will continue Hydrea unchanged.  We will see him back in 6 months for follow-up, he will continue CBC every 3 months.    -7/2/24 white count 5400 hemoglobin 13.5 platelets 211,000 with unremarkable differential  -10/1/2024 white  "count 6100 hemoglobin 14 platelets 250,000 with unremarkable differential    -10/8/2024 [facility] hematology oncology follow-up: Doing wonderfully on Hydrea 1000 mg daily with essentially normal counts and differential and no significant perceptible toxicities.  Continue quarterly labs with twice annual visits     Polycythemia vera   6/30/2016 Initial Diagnosis    Polycythemia vera         HISTORY OF PRESENT ILLNESS:  The patient is a 74 y.o. male, here for follow up on management of JAK2 positive PRV on Hydrea since November 2016 without complication.    Past Medical History:   Diagnosis Date    Arthritis     Erythrocytosis 10/20/2016    Hypertension     Polycythemia vera      No past surgical history on file.    No Known Allergies    Family History and Social History reviewed and changed as necessary    REVIEW OF SYSTEM:   No new somatic complaints    PHYSICAL EXAM:  No jaundice icterus or pallor.  No respiratory distress.  No rashes.  No palpable hepatosplenomegaly.    Vitals:    10/08/24 0827   BP: 142/78   Pulse: 58   Resp: 18   Temp: 97.3 °F (36.3 °C)   Weight: 96.6 kg (213 lb)   Height: 188 cm (74\")     Vitals:    10/08/24 0827   PainSc: 0-No pain          ECOG score: 0           Vitals reviewed.      Lab Results   Component Value Date    HGB 14.6 10/15/2021    HCT 41.1 10/15/2021     (H) 10/15/2021     10/15/2021    WBC 5.4 10/15/2021    NEUTROABS 2.9 10/15/2021    LYMPHSABS 2.0 10/15/2021    MONOSABS 0.5 10/15/2021    EOSABS 0.0 10/15/2021    BASOSABS 0.0 10/15/2021       Lab Results   Component Value Date    GLUCOSE 96 10/24/2019    BUN 15 10/24/2019    CREATININE 0.79 10/24/2019     10/24/2019    K 4.4 10/24/2019     10/24/2019    CO2 26 10/24/2019    CALCIUM 9.5 10/24/2019    ALBUMIN 4.3 10/24/2019    BILITOT 0.4 10/24/2019    ALKPHOS 74 10/24/2019    AST 22 10/24/2019    ALT 20 10/24/2019             ASSESSMENT & PLAN:  1.  SHELBY-2 mutation positive polycythemia vera  A) Goal to " maintain HCT at 45 or below   B) Hydrea 1000 mg/day started 11/2/16    2.  Vitamin B12 deficiency   -receives B12 injections with Dr. Delgado      -7/2/24 white count 5400 hemoglobin 13.5 platelets 211,000 with unremarkable differential  -10/1/2024 white count 6100 hemoglobin 14 platelets 250,000 with unremarkable differential    -10/8/2024 [facility] hematology oncology follow-up: Doing wonderfully on Hydrea 1000 mg daily with essentially normal counts and differential and no significant perceptible toxicities.  Continue quarterly labs with twice annual visits    Total time of care today inclusive of time spent today prior to patient's arrival reviewing interval data and during visit translating information to patient putting forth a plan as outlined above and after visit instituting this plan took 35 minutes patient care time throughout the day today.  Abdelrahman Santos MD    10/08/2024

## 2024-10-08 NOTE — LETTER
October 8, 2024       No Recipients    Patient: Michi Lainez   YOB: 1950   Date of Visit: 10/8/2024     Dear Delfino Delgado MD:       Thank you for referring Michi Lainez to me for evaluation. Below are the relevant portions of my assessment and plan of care.    If you have questions, please do not hesitate to call me. I look forward to following Michi along with you.         Sincerely,        Abdelrahman Santos MD        CC:   No Recipients    Abdelrahman Santos MD  10/08/24 0837  Sign when Signing Visit  CHIEF COMPLAINT: No new somatic complaints    Problem List:  Oncology/Hematology History Overview Note   1.  SHELBY-2 mutation positive polycythemia vera  A) Goal to maintain HCT at 45 or below   B) Hydrea 1000 mg/day started 11/2/16    2.  Vitamin B12 deficiency   -receives B12 injections with Dr. Delgado    -5/4/2022 [facility] Oncology clinic follow-up:  Mr. Lainez continues to tolerate therapy with Hydrea 1000 mg daily along with baby aspirin.  He has been on Hydrea since November 2017.  CBC as shown above is unremarkable with no anemia, normal white blood cell count and platelet count.  He will continue Hydrea 1000 mg daily unchanged along with aspirin 81 mg daily.  We will repeat CBC in 6 months prior to return and I have ordered that today.  Recent labs with Dr. Delgado shows his B12 low at 177 and elevated methylmalonic acid of 1488.  We discussed today that I do not feel this is related to his Hydrea therapy or his polycythemia.  I recommended that he begin B12 replacement with Dr. Delgado, if he is unable to arrange B12 injections through his office we will be glad to set that up here.  I also recommended he talk to Dr. Delgado about possible EGD.  He has had a little more dyspepsia, we discussed that an EGD may be helpful in checking as to why he may be B12 deficient.  He is not a strict vegetarian, he reports that he does not eat red meat but he does eat deer meat.  He is not on any medications that I  can tell that would impair absorption.    -11/10/2022 [facility] hematology APRN follow-up:  JAK2 positive polycythemia vera:  Mr. Lainez continues to do well on Hydrea 1000 mg daily along with baby aspirin.  His current CBC on 10/19/2022 as shown above was unremarkable.  MCV is elevated at 110, this is a common side effect of Hydrea with macrocytosis with MCV >97% and 42% of those taking it.  He will continue Hydrea unchanged.  He is getting ready to travel to [place] [place] [facility]", I have sent a prescription for amoxicillin 500 mg 1 tablet twice daily, #20 with 0 refills as he requested something to take with him in case he develops any symptoms of a URI.  We will repeat CBC again in 6 months prior to return and I have ordered that today.  He continues on B12 injections monthly with Dr. Delgado, his most recent labs show his B12 now normal at 402.  He will continue his B12 injections.    --1/17/2023 white count 6600 hemoglobin 14.2  unremarkable differential.  Platelets 232,000.  B12 over 2000.  Folate normal 12.2.  -4/19/2023 white count 5200 hemoglobin 14.8 platelets 225,000    -4/25/2023 [facility] oncology clinic follow-up: I reviewed the above labs with the patient.  He is doing very well on his periodic B12 shots with primary care and Hydrea 1000 mg daily along with baby aspirin.  We will check his CBC in 3 months and prior to return to my nurse practitioner in 6 months.    -11/1/2023 [facility] hematology/oncology clinic telemedicine follow-up: Telemedicine visit was done today due to patient having upper respiratory infection.  He feels that he is getting better, he is on doxycycline prescribed by his PCP.  Afebrile today.  CBC reviewed from 10/27/2023 was unremarkable with normal WBC 7.6, hemoglobin 14.2, hematocrit 40.6%, platelet count 215,000.  He continues on Hydrea 1000 mg daily for his PRV.  Other than for current URI he denies frequent infections.  We discussed the importance to let us know if he did develop more  "frequent infections.    -4/3/2024 [facility] oncology/hematology clinic follow-up: Michi continues to do well on Hydrea 1000 mg daily.  I reviewed his CBC from 3/26/2024 and 1/22/2024, both were unremarkable, 3/26/2024 CBC with WBC 5.0, hemoglobin 14.5, hematocrit 41.3%, platelet count 213,000.  He will continue Hydrea unchanged.  We will see him back in 6 months for follow-up, he will continue CBC every 3 months.    -7/2/24 white count 5400 hemoglobin 13.5 platelets 211,000 with unremarkable differential  -10/1/2024 white count 6100 hemoglobin 14 platelets 250,000 with unremarkable differential    -10/8/2024 [facility] hematology oncology follow-up: Doing wonderfully on Hydrea 1000 mg daily with essentially normal counts and differential and no significant perceptible toxicities.  Continue quarterly labs with twice annual visits     Polycythemia vera   6/30/2016 Initial Diagnosis    Polycythemia vera         HISTORY OF PRESENT ILLNESS:  The patient is a 74 y.o. male, here for follow up on management of JAK2 positive PRV on Hydrea since November 2016 without complication.    Past Medical History:   Diagnosis Date   • Arthritis    • Erythrocytosis 10/20/2016   • Hypertension    • Polycythemia vera      No past surgical history on file.    No Known Allergies    Family History and Social History reviewed and changed as necessary    REVIEW OF SYSTEM:   No new somatic complaints    PHYSICAL EXAM:  No jaundice icterus or pallor.  No respiratory distress.  No rashes.  No palpable hepatosplenomegaly.    Vitals:    10/08/24 0827   BP: 142/78   Pulse: 58   Resp: 18   Temp: 97.3 °F (36.3 °C)   Weight: 96.6 kg (213 lb)   Height: 188 cm (74\")     Vitals:    10/08/24 0827   PainSc: 0-No pain          ECOG score: 0           Vitals reviewed.      Lab Results   Component Value Date    HGB 14.6 10/15/2021    HCT 41.1 10/15/2021     (H) 10/15/2021     10/15/2021    WBC 5.4 10/15/2021    NEUTROABS 2.9 10/15/2021    LYMPHSABS " 2.0 10/15/2021    MONOSABS 0.5 10/15/2021    EOSABS 0.0 10/15/2021    BASOSABS 0.0 10/15/2021       Lab Results   Component Value Date    GLUCOSE 96 10/24/2019    BUN 15 10/24/2019    CREATININE 0.79 10/24/2019     10/24/2019    K 4.4 10/24/2019     10/24/2019    CO2 26 10/24/2019    CALCIUM 9.5 10/24/2019    ALBUMIN 4.3 10/24/2019    BILITOT 0.4 10/24/2019    ALKPHOS 74 10/24/2019    AST 22 10/24/2019    ALT 20 10/24/2019             ASSESSMENT & PLAN:  1.  SHELBY-2 mutation positive polycythemia vera  A) Goal to maintain HCT at 45 or below   B) Hydrea 1000 mg/day started 11/2/16    2.  Vitamin B12 deficiency   -receives B12 injections with Dr. Delgado      -7/2/24 white count 5400 hemoglobin 13.5 platelets 211,000 with unremarkable differential  -10/1/2024 white count 6100 hemoglobin 14 platelets 250,000 with unremarkable differential    -10/8/2024 [facility] hematology oncology follow-up: Doing wonderfully on Hydrea 1000 mg daily with essentially normal counts and differential and no significant perceptible toxicities.  Continue quarterly labs with twice annual visits    Total time of care today inclusive of time spent today prior to patient's arrival reviewing interval data and during visit translating information to patient putting forth a plan as outlined above and after visit instituting this plan took 35 minutes patient care time throughout the day today.  Abdelrahman Santos MD    10/08/2024

## 2024-10-09 ENCOUNTER — SPECIALTY PHARMACY (OUTPATIENT)
Facility: HOSPITAL | Age: 74
End: 2024-10-09
Payer: MEDICARE

## 2025-01-27 ENCOUNTER — TELEPHONE (OUTPATIENT)
Dept: ONCOLOGY | Facility: CLINIC | Age: 75
End: 2025-01-27
Payer: MEDICARE

## 2025-01-27 NOTE — TELEPHONE ENCOUNTER
Called patient back and informed him that he can bring lab results to the [place] office tomorrow and as long as a CBC was drawn it should be sufficient. He verbalized understanding.

## 2025-01-27 NOTE — TELEPHONE ENCOUNTER
Caller: Michi Lainez    Relationship: Self    Best call back number: [phone]    What is the best time to reach you: ANYTIME TODAY     CAN LEAVE VM IF UNABLE TO REACH     Who are you requesting to speak with (clinical staff, provider,  specific staff member): NOÉ /CLINICAL        What was the call regarding:     HAD LABS DRAWN GETS EVERY THREE MONTHS FOR DR LEARY, HAD COMPLETED AT DR PRIETO OFFICE, THEY ARE DRAWING THE SAME LABS DR LEARY ORDERED, THE PHLEBOTOMIST ONLY RAVINDER DR PRIETO LABS WHICH ARE THE SAME AS DR LEARY AND THEY SAID COULD STICK HIM AGAIN OR  CAN TAKE THE LAB REPORT DONE FROM DR PRIETO TO DR LEARY OFFICE    HAS THE LAB REPORT AND WANTED TO KNOW IF THESE LABS WOULD BE OK AND IF CAN BRING IN TO [place] OFFICE TOMORROW     Is it okay if the provider responds through MyChart: NO

## 2025-04-16 ENCOUNTER — OFFICE VISIT (OUTPATIENT)
Dept: ONCOLOGY | Facility: CLINIC | Age: 75
End: 2025-04-16
Payer: MEDICARE

## 2025-04-16 VITALS
BODY MASS INDEX: 26.05 KG/M2 | HEIGHT: 74 IN | RESPIRATION RATE: 18 BRPM | OXYGEN SATURATION: 96 % | DIASTOLIC BLOOD PRESSURE: 59 MMHG | SYSTOLIC BLOOD PRESSURE: 102 MMHG | TEMPERATURE: 97.5 F | HEART RATE: 75 BPM | WEIGHT: 203 LBS

## 2025-04-16 DIAGNOSIS — D45 POLYCYTHEMIA VERA: Primary | ICD-10-CM

## 2025-04-16 PROCEDURE — 1160F RVW MEDS BY RX/DR IN RCRD: CPT | Performed by: NURSE PRACTITIONER

## 2025-04-16 PROCEDURE — 1126F AMNT PAIN NOTED NONE PRSNT: CPT | Performed by: NURSE PRACTITIONER

## 2025-04-16 PROCEDURE — 1159F MED LIST DOCD IN RCRD: CPT | Performed by: NURSE PRACTITIONER

## 2025-04-16 PROCEDURE — 99213 OFFICE O/P EST LOW 20 MIN: CPT | Performed by: NURSE PRACTITIONER

## 2025-04-16 NOTE — PROGRESS NOTES
CHIEF COMPLAINT: A little woozy after having had screening colonoscopy this morning    Problem List:  Oncology/Hematology History Overview Note   1.  SHELBY-2 mutation positive polycythemia vera  A) Goal to maintain HCT at 45 or below   B) Hydrea 1000 mg/day started 11/2/16    2.  Vitamin B12 deficiency   -receives B12 injections with Dr. Delgado    -5/4/2022 [facility] Oncology clinic follow-up:  Mr. Lainez continues to tolerate therapy with Hydrea 1000 mg daily along with baby aspirin.  He has been on Hydrea since November 2017.  CBC as shown above is unremarkable with no anemia, normal white blood cell count and platelet count.  He will continue Hydrea 1000 mg daily unchanged along with aspirin 81 mg daily.  We will repeat CBC in 6 months prior to return and I have ordered that today.  Recent labs with Dr. Delgado shows his B12 low at 177 and elevated methylmalonic acid of 1488.  We discussed today that I do not feel this is related to his Hydrea therapy or his polycythemia.  I recommended that he begin B12 replacement with Dr. Delgado, if he is unable to arrange B12 injections through his office we will be glad to set that up here.  I also recommended he talk to Dr. Delgado about possible EGD.  He has had a little more dyspepsia, we discussed that an EGD may be helpful in checking as to why he may be B12 deficient.  He is not a strict vegetarian, he reports that he does not eat red meat but he does eat deer meat.  He is not on any medications that I can tell that would impair absorption.    -11/10/2022 [facility] hematology APRN follow-up:  JAK2 positive polycythemia vera:  Mr. Lainez continues to do well on Hydrea 1000 mg daily along with baby aspirin.  His current CBC on 10/19/2022 as shown above was unremarkable.  MCV is elevated at 110, this is a common side effect of Hydrea with macrocytosis with MCV >97% and 42% of those taking it.  He will continue Hydrea unchanged.  He is getting ready to travel to [facility], I have  sent a prescription for amoxicillin 500 mg 1 tablet twice daily, #20 with 0 refills as he requested something to take with him in case he develops any symptoms of a URI.  We will repeat CBC again in 6 months prior to return and I have ordered that today.  He continues on B12 injections monthly with Dr. Delgado, his most recent labs show his B12 now normal at 402.  He will continue his B12 injections.    --1/17/2023 white count 6600 hemoglobin 14.2  unremarkable differential.  Platelets 232,000.  B12 over 2000.  Folate normal 12.2.  -4/19/2023 white count 5200 hemoglobin 14.8 platelets 225,000    -4/25/2023 [facility] oncology clinic follow-up: I reviewed the above labs with the patient.  He is doing very well on his periodic B12 shots with primary care and Hydrea 1000 mg daily along with baby aspirin.  We will check his CBC in 3 months and prior to return to my nurse practitioner in 6 months.    -11/1/2023 [facility] hematology/oncology clinic telemedicine follow-up: Telemedicine visit was done today due to patient having upper respiratory infection.  He feels that he is getting better, he is on doxycycline prescribed by his PCP.  Afebrile today.  CBC reviewed from 10/27/2023 was unremarkable with normal WBC 7.6, hemoglobin 14.2, hematocrit 40.6%, platelet count 215,000.  He continues on Hydrea 1000 mg daily for his PRV.  Other than for current URI he denies frequent infections.  We discussed the importance to let us know if he did develop more frequent infections.    -4/3/2024 [facility] oncology/hematology clinic follow-up: Michi continues to do well on Hydrea 1000 mg daily.  I reviewed his CBC from 3/26/2024 and 1/22/2024, both were unremarkable, 3/26/2024 CBC with WBC 5.0, hemoglobin 14.5, hematocrit 41.3%, platelet count 213,000.  He will continue Hydrea unchanged.  We will see him back in 6 months for follow-up, he will continue CBC every 3 months.    -7/2/24 white count 5400 hemoglobin 13.5 platelets 211,000  "with unremarkable differential  -10/1/2024 white count 6100 hemoglobin 14 platelets 250,000 with unremarkable differential    -10/8/2024 [facility] [place] hematology oncology follow-up: Doing wonderfully on Hydrea 1000 mg daily with essentially normal counts and differential and no significant perceptible toxicities.  Continue quarterly labs with twice annual visits     Polycythemia vera   6/30/2016 Initial Diagnosis    Polycythemia vera         HISTORY OF PRESENT ILLNESS:  The patient is a 74 y.o. male, here for follow up on management of polycythemia vera currently on Hydrea 1000 mg daily since November 2016.  Michi has been doing well since we saw him last with no new concerns.  He states that he is a little woozy as he had his screening colonoscopy this morning.  He states he had 1 small polyp removed and was told as long as this was negative he no longer has to have colonoscopies in the future for screening purposes.  Continues on B12 injections monthly.    Past Medical History:   Diagnosis Date    Arthritis     Erythrocytosis 10/20/2016    Hypertension     Polycythemia vera      No past surgical history on file.    No Known Allergies    Family History and Social History reviewed and changed as necessary    REVIEW OF SYSTEM:   No new somatic concerns    PHYSICAL EXAM:  Well-developed, well-nourished appearing male in no distress  Respirations regular and unlabored, lungs clear to auscultation bilaterally  Heart regular rate and rhythm  Skin without rash    Vitals:    04/16/25 1359   BP: 102/59   Pulse: 75   Resp: 18   Temp: 97.5 °F (36.4 °C)   SpO2: 96%   Weight: 92.1 kg (203 lb)   Height: 188 cm (74\")     Vitals:    04/16/25 1359   PainSc: 0-No pain          ECOG score: 0           Vitals reviewed.  Labs reviewed    ECOG: (0) Fully Active - Able to Carry On All Pre-disease Performance Without Restriction    Lab Results   Component Value Date    HGB 14.6 10/15/2021    HCT 41.1 10/15/2021     (H) 10/15/2021    PLT " 232 10/15/2021    WBC 5.4 10/15/2021    NEUTROABS 2.9 10/15/2021    LYMPHSABS 2.0 10/15/2021    MONOSABS 0.5 10/15/2021    EOSABS 0.0 10/15/2021    BASOSABS 0.0 10/15/2021       Lab Results   Component Value Date    GLUCOSE 96 10/24/2019    BUN 15 10/24/2019    CREATININE 0.79 10/24/2019     10/24/2019    K 4.4 10/24/2019     10/24/2019    CO2 26 10/24/2019    CALCIUM 9.5 10/24/2019    PROTEINTOT 6.6 10/24/2019    ALBUMIN 4.3 10/24/2019    BILITOT 0.4 10/24/2019    ALKPHOS 74 10/24/2019    AST 22 10/24/2019    ALT 20 10/24/2019     4/4/2025 CBC WBC 5.0, hemoglobin 14.5, hematocrit 42.5%, platelet count 263,000.        ASSESSMENT & PLAN:    1.  SHELBY-2 mutation positive polycythemia vera  A) Goal to maintain HCT at 45 or below   B) Hydrea 1000 mg/day started 11/2/16  2.  Vitamin B12 deficiency   -receives B12 injections with Dr. Delgado    Discussion: Michi continues to do well on Hydrea 1000 mg daily with no unusual side effects.  Current CBC looks good with hematocrit 42.5%, WBC is normal along with normal platelet count as shown above.  We will continue therapy unchanged.  We will monitor CBC quarterly.  We will see him back for follow-up in 6 months.    This was a level 3, limited [facility] visit with management of stable chronic illness, review of labs, prescription drug management.  Garima Oswald, APRN    04/16/2025

## 2025-04-17 ENCOUNTER — SPECIALTY PHARMACY (OUTPATIENT)
Dept: ONCOLOGY | Facility: HOSPITAL | Age: 75
End: 2025-04-17
Payer: MEDICARE

## 2025-08-01 ENCOUNTER — TELEPHONE (OUTPATIENT)
Dept: ONCOLOGY | Facility: CLINIC | Age: 75
End: 2025-08-01
Payer: MEDICARE

## 2025-08-01 NOTE — TELEPHONE ENCOUNTER
Called and notified patient that lab results had not been received yet, he states they were drawn at his PCP office. Called Dr. Adames office and they will fax results to [phone].

## 2025-08-01 NOTE — TELEPHONE ENCOUNTER
Caller: Michi Lainez    Relationship: Self    Best call back number: [phone]     What is the best time to reach you: ANYTIME    Who are you requesting to speak with (clinical staff, provider,  specific staff member):     What was the call regarding: PATIENT HAD LABS AROUND 7/22 AND THE RESULTS WERE FAXED TO THE OFFICE TODAY. HE WANTED TO CHECK TO SEE IF THESE WERE RECVD.    PLEASE ADVISE. CAN LEAVE DETAILED VM IF NO ANSWER.

## 2025-08-04 NOTE — TELEPHONE ENCOUNTER
Called and informed patient that labs were received and Garima reviewed them and they look good. Patient verbalized understanding.